# Patient Record
Sex: MALE | Race: WHITE | Employment: UNEMPLOYED | URBAN - METROPOLITAN AREA
[De-identification: names, ages, dates, MRNs, and addresses within clinical notes are randomized per-mention and may not be internally consistent; named-entity substitution may affect disease eponyms.]

---

## 2017-06-22 PROBLEM — R06.09 DYSPNEA ON EXERTION: Status: ACTIVE | Noted: 2017-06-22

## 2017-07-28 PROBLEM — R40.0 HAS DAYTIME DROWSINESS: Status: ACTIVE | Noted: 2017-07-28

## 2017-07-28 PROBLEM — R06.83 SNORING: Status: ACTIVE | Noted: 2017-07-28

## 2017-07-28 PROBLEM — R74.8 ELEVATED LIVER ENZYMES: Status: ACTIVE | Noted: 2017-07-28

## 2017-07-28 PROBLEM — E66.01 OBESITY, MORBID, BMI 50 OR HIGHER (HCC): Status: ACTIVE | Noted: 2017-07-28

## 2017-07-31 ENCOUNTER — HOSPITAL ENCOUNTER (OUTPATIENT)
Dept: ULTRASOUND IMAGING | Age: 27
Discharge: HOME | End: 2017-07-31
Payer: COMMERCIAL

## 2017-07-31 DIAGNOSIS — R74.8 ELEVATED LIVER ENZYMES: ICD-10-CM

## 2017-07-31 PROCEDURE — 76705 ECHO EXAM OF ABDOMEN: CPT

## 2017-08-10 ENCOUNTER — HOSPITAL ENCOUNTER (OUTPATIENT)
Dept: PHYSICAL THERAPY | Age: 27
Discharge: HOME | End: 2017-08-10
Payer: COMMERCIAL

## 2017-08-10 DIAGNOSIS — E66.01 OBESITY, MORBID, BMI 50 OR HIGHER (HCC): ICD-10-CM

## 2017-08-10 DIAGNOSIS — Z78.9 POOR TOLERANCE FOR AMBULATION: ICD-10-CM

## 2017-08-10 PROCEDURE — 97162 PT EVAL MOD COMPLEX 30 MIN: CPT

## 2017-08-10 PROCEDURE — 97110 THERAPEUTIC EXERCISES: CPT

## 2017-08-10 NOTE — PROGRESS NOTES
Melissa Welch  : 1990 [facility] [address], [facility]  [phone]   [phone]        OUTPATIENT PHYSICAL THERAPY:Initial Assessment 8/10/2017    ICD-10: Treatment Diagnosis: Difficulty walking, not elsewhere classified (R26.2)  Treatment diagnosis 2: Morbid obesity (E66.01)  Treatment diagnosis 3: pain in ankle, unspecified foot (M25.579)  Precautions/Allergies:   Sting, bee and Bee pollen   Fall Risk Score: 2 (? 5 = High Risk)  MD Orders: evaluate and treat  MEDICAL/REFERRING DIAGNOSIS:  Obesity, morbid, BMI 50 or higher ([facility] [address] [E66.01]  Poor tolerance for ambulation [Z78.9]   DATE OF ONSET: Gradual and progressive weight gain  REFERRING PHYSICIAN: Janes Dominique PA-C  RETURN PHYSICIAN APPOINTMENT: 17     INITIAL ASSESSMENT:  Mr. Lakesha Tatum is a 23 y.o. male presenting to physical therapy with complaints of difficulty walking and standing. Patient presents with severe morbid obesity and will be discussing gastric by-pass with surgeon in late 2018. Patient reports congestive heart failure related to obesity and also type II diabetes. Patient reports pain in ankles and instability with prolonged standing and walking. Patient reports no significant pain in other body areas. Patient has lost 100+ pounds already and currently weighs 510 lbs. Patient is a good candidate for skilled physical therapy to include therapeutic exercise, manual therapy, gait training, home exercise, and pain modalities as needed. PROBLEM LIST (Impacting functional limitations):  1. Decreased Strength  2. Decreased ADL/Functional Activities  3. Decreased Transfer Abilities  4. Decreased Ambulation Ability/Technique  5. Decreased Balance  6. Increased Pain  7. Decreased Activity Tolerance  8. Increased Fatigue  9. Increased Shortness of Breath  10.  Decreased Flexibility/Joint Mobility INTERVENTIONS PLANNED:  1. Balance Exercise  2. Cold  3. Cryotherapy  4. Electrical Stimulation  5. Gait Training  6. Heat  7. Home Exercise Program (HEP)  8. Manual Therapy  9. Neuromuscular Re-education/Strengthening  10. Range of Motion (ROM)  11. Therapeutic Activites  12. Therapeutic Exercise/Strengthening  13. Transcutaneous Electrical Nerve Stimulation (TENS)  14. Transfer Training  15. Ultrasound (US)   TREATMENT PLAN:  Effective Dates: 8/10/17 TO 9/7/17. Frequency/Duration: 1 time a week for 4 weeks  GOALS: (Goals have been discussed and agreed upon with patient.)  Short-Term Functional Goals: Time Frame: 8/10/17 to 8/24/17  1. Patient will be independent with home exercise program.  Discharge Goals: Time Frame: 8/10/17 to 9/7/17  1. Patient will be independent with home exercise program and report performing daily. 2. Patient will report no more than 2/10 pain in bilateral ankles with weight bearing and ambulation. 3. Patient will increase ambulation time to greater than 15 minutes without rest and with minimal pain. 4. Patient will report continued weight loss. 5. Patient will improve Lower extremity functional scale score to 52/80 from 41/80. Rehabilitation Potential For Stated Goals: Good  Regarding Katherine Hernandez therapy, I certify that the treatment plan above will be carried out by a therapist or under their direction. Thank you for this referral,  Melissa Norris PT     Referring Physician Signature: Loretta Carreon PA-C              Date                    The information in this section was collected on 8/10/17 (except where otherwise noted). HISTORY:   History of Present Injury/Illness (Reason for Referral):  Mr. Chance Longo is a 23 y.o. male presenting to physical therapy with complaints of difficulty walking and standing. Patient presents with severe morbid obesity and will be discussing gastric by-pass with surgeon in late August 2018.   Patient reports congestive heart failure related to obesity and also type II diabetes. Patient reports pain in ankles and instability with prolonged standing and walking. Patient reports no significant pain in other body areas. Patient has lost 100+ pounds already and currently weighs 510 lbs. Past Medical History/Comorbidities:   Mr. Ny Andino  has a past medical history of Anasarca; Congestive heart failure ([address] Hypertension; Lazy eye of right side; and Morbid obesity ([address] Mr. Ny Andino  has a past surgical history that includes tonsillectomy. Social History/Living Environment:     Patient lives at home with family. 2 level home but lives downstairs. Prior Level of Function/Work/Activity:  Patient does not work. Spends most of time at home sitting. Current Medications:       Current Outpatient Prescriptions:     rosuvastatin (CRESTOR) 20 mg tablet, Take 1 Tab by mouth nightly for 30 days. , Disp: 30 Tab, Rfl: 3    insulin degludec (TRESIBA FLEXTOUCH U-200) 200 unit/mL (3 mL) inpn, 80 Units by SubCUTAneous route daily for 30 days. , Disp: 5 Pen, Rfl: 0    lisinopril (PRINIVIL, ZESTRIL) 40 mg tablet, Take 1 Tab by mouth daily. , Disp: 90 Tab, Rfl: 3    spironolactone (ALDACTONE) 25 mg tablet, Take 1 Tab by mouth daily. , Disp: 90 Tab, Rfl: 3    carvedilol (COREG) 25 mg tablet, Take 1 Tab by mouth two (2) times daily (with meals). Indications: Chronic Heart Failure, Disp: 180 Tab, Rfl: 3    Lancets (LANCETS,THIN) misc, Test 2 times daily as directed for e11.65, Disp: 1 Each, Rfl: 11    metFORMIN (GLUCOPHAGE) 1,000 mg tablet, Take 1 Tab by mouth two (2) times daily (with meals) for 30 days. , Disp: 60 Tab, Rfl: 3    pen needle, diabetic (NOVOFINE PLUS) 32 gauge x 1/6\" ndle, 1 Each by Does Not Apply route daily. Use daily with insulin, Disp: 100 Pen Needle, Rfl: 1    rosuvastatin (CRESTOR) 10 mg tablet, Take 2 Tabs by mouth nightly., Disp: 30 Tab, Rfl: 5    insulin aspart (NOVOLOG FLEXPEN) 100 unit/mL inpn, 12 Units by SubCUTAneous route Before breakfast, lunch, and dinner. , Disp: 5 Pen, Rfl: 5    fenofibrate nanocrystallized (TRICOR) 145 mg tablet, Take 1 Tab by mouth daily. , Disp: 30 Tab, Rfl: 5    OMEGA-3 FATTY ACIDS (FISH OIL CONCENTRATE PO), Take  by mouth., Disp: , Rfl:     multivitamin (ONE A DAY) tablet, Take 1 Tab by mouth daily. , Disp: , Rfl:     CALCIUM CARBONATE (CALCIUM 500 PO), Take  by mouth., Disp: , Rfl:     glucose blood VI test strips (BLOOD GLUCOSE TEST) strip, Test sugar  2 x daily for e11.65, Disp: 200 Strip, Rfl: 6    Blood-Glucose Meter monitoring kit, Use kit for monitoring diabetes for e11.65, Disp: 1 Kit, Rfl: 0   Date Last Reviewed:  8/10/2017   Number of Personal Factors/Comorbidities that affect the Plan of Care:  (morbid obesity, congestive heart failure, diabetes) 3+: HIGH COMPLEXITY   EXAMINATION:   Observation/Orthostatic Postural Assessment:          Patient ambulates with wide stance, extreme external rotation of bilateral LE's/feet, and walks on lateral border of feet. Strength:            Gross strength in bilateral UE's is 3+/5. Gross strength in bilateral LE's is 4/5 but patient has limited mobility due to excessive adipose tissue. Neurological Screen:        Myotomes:  Bilateral lower extremities are normal        Dermatomes:  Intact for light touch and sensation    Functional Mobility:         Transfers: Moderate difficulty due to weight       Body Structures Involved:  1. Heart  2. Metabolic  3. Bones  4. Joints  5. Muscles Body Functions Affected:  1. Sensory/Pain  2. Cardio  3. Neuromusculoskeletal  4. Movement Related  5. Metobolic/Endocrine Activities and Participation Affected:  1. General Tasks and Demands  2. Mobility  3. Self Care  4. Domestic Life  5.  Community, Social and [place]   Number of elements (examined above) that affect the Plan of Care: 4+: HIGH COMPLEXITY   CLINICAL PRESENTATION:   Presentation: Evolving clinical presentation with changing clinical characteristics: Janelle [address] MAKING:   Outcome Measure: Tool Used: Lower Extremity Functional Scale (LEFS)  Score:  Initial: 41/80 Most Recent: X/80 (Date: -- )   Interpretation of Score: 20 questions each scored on a 5 point scale with 0 representing \"extreme difficulty or unable to perform\" and 4 representing \"no difficulty\". The lower the score, the greater the functional disability. 80/80 represents no disability. Minimal detectable change is 9 points. Score 80 79-63 62-48 47-32 31-16 15-1 0   Modifier CH CI CJ CK CL CM CN       Medical Necessity:   · Patient is expected to demonstrate progress in strength, range of motion, balance, coordination and functional technique to increase tolerance for weight bearing and ambulation, and facilitate weight loss. · Skilled intervention continues to be required due to difficulty with ambulation and excess weight gain. Reason for Services/Other Comments:  · Patient continues to require skilled intervention due to difficulty with ambulation and excessive weight gain. Use of outcome tool(s) and clinical judgement create a POC that gives a:  (Severe obesity, minor pain, multiple co-morbidities, moderate limitations on outcome measure) Questionable prediction of patient's progress: MODERATE COMPLEXITY            TREATMENT:   (In addition to Assessment/Re-Assessment sessions the following treatments were rendered)  Pre-treatment Symptoms/Complaints:  Patient only reports pain with prolonged standing and walking while wearing shoes. Patient reports no pain when bare foot. Pain is located in bilateral ankles. Pain: Initial: Pain Intensity 1: 0  Post Session:  Patient reports 0/10 pain     Therapeutic Exercise: (25 Minutes):  Exercises per grid below to improve mobility, strength, balance, coordination and dynamic movement of leg - bilateral to improve functional weight bearing and ambulation.   Required minimal visual, verbal and manual cues to promote proper body alignment, promote proper body posture and promote proper body mechanics. Progressed resistance, range, repetitions and complexity of movement as indicated. Date:  8/10/17 Date:   Date:     Activity/Exercise Parameters Parameters Parameters   Band curls 1x10, red     Band shoulder flexion 1x10, red     Band pull aparts 1x10, red     Band rows 1x10, green     Diagonal pull aparts 1x10,red     Arm circles 1x10     Ankles pumps 1x10     Hip circles 1x10               Manual Therapy (     ): none today    Therapeutic Modalities: for pain and edema                                                                                               HEP: As above; handouts given to patient for all exercises. Treatment/Session Assessment:    · Response to Treatment:  Patient tolerated all exercises but displayed significant fatigue with short bouts of exercise. Patient showed good understanding of home program.  · Compliance with Program/Exercises: compliant most of the time. · Recommendations/Intent for next treatment session: \"Next visit will focus on advancements to more challenging activities\". Progress overall activity and exercise as tolerated. Focus on home exercise program and increased overall activity to facilitate weight loss.     Total Treatment Duration: 45 minutes; 20 minute evaluation, 25 minutes therapeutic exercise    PT Patient Time In/Time Out  Time In: 1430  Time Out: [address] [facility], PT

## 2017-08-10 NOTE — PROGRESS NOTES
Ambulatory/Rehab Services H2 Model Falls Risk Assessment    Risk Factor Pts. ·   Confusion/Disorientation/Impulsivity  []    4 ·   Symptomatic Depression  []   2 ·   Altered Elimination  []   1 ·   Dizziness/Vertigo  []   1 ·   Gender (Male)  [x]   1 ·   Any administered antiepileptics (anticonvulsants):  []   2 ·   Any administered benzodiazepines:  []   1 ·   Visual Impairment (specify):  []   1 ·   Portable Oxygen Use  []   1 ·   Orthostatic ? BP  []   1 ·   History of Recent Falls (within 3 mos.)  []   5     Ability to Rise from Chair (choose one) Pts. ·   Ability to rise in a single movement  []   0 ·   Pushes up, successful in one attempt  [x]   1 ·   Multiple attempts, but successful  []   3 ·   Unable to rise without assistance  []   4   Total: (5 or greater = High Risk) 2     Falls Prevention Plan:   []                Physical Limitations to Exercise (specify):   []                Mobility Assistance Device (type):   []                Exercise/Equipment Adaptation (specify):    ©2010 [facility] Patent #[id number].  Federal Law prohibits the replication, distribution or use without written permission from [facility] [employer]

## 2017-08-14 PROBLEM — K76.0 FATTY INFILTRATION OF LIVER: Status: ACTIVE | Noted: 2017-08-14

## 2017-08-14 PROBLEM — R16.0 HEPATOMEGALIA: Status: ACTIVE | Noted: 2017-08-14

## 2017-08-22 ENCOUNTER — HOSPITAL ENCOUNTER (OUTPATIENT)
Dept: PHYSICAL THERAPY | Age: 27
Discharge: HOME | End: 2017-08-22
Payer: COMMERCIAL

## 2017-08-22 NOTE — PROGRESS NOTES
Patient contacted clinic on 8/21/17 to report that he had fallen at home over the weekend and was still feeling sore. Patient was advised that if pain persists then patient shoulder follow up with MD for evaluation. Patient was instructed that if he feels better then he should attend therapy on 8/22/17. Patient's mother called clinic on the morning of 8/22/17 and reported that patient would not be attending therapy today.     Ciro Phillip, PT, DPT

## 2017-08-29 ENCOUNTER — APPOINTMENT (OUTPATIENT)
Dept: PHYSICAL THERAPY | Age: 27
End: 2017-08-29
Payer: COMMERCIAL

## 2017-09-01 ENCOUNTER — HOSPITAL ENCOUNTER (OUTPATIENT)
Dept: PHYSICAL THERAPY | Age: 27
Discharge: HOME | End: 2017-09-01
Payer: COMMERCIAL

## 2017-09-01 PROCEDURE — 97110 THERAPEUTIC EXERCISES: CPT

## 2017-09-01 NOTE — PROGRESS NOTES
Cely Esparza  : 1990 [facility] at [address], [address] [place] [address]  [phone]   [phone]        OUTPATIENT PHYSICAL THERAPY:Daily Note 2017    ICD-10: Treatment Diagnosis: Difficulty walking, not elsewhere classified (R26.2)  Treatment diagnosis 2: Morbid obesity (E66.01)  Treatment diagnosis 3: pain in ankle, unspecified foot (M25.579)  Precautions/Allergies:   Sting, bee and Bee pollen   Fall Risk Score: 2 (? 5 = High Risk)  MD Orders: evaluate and treat  MEDICAL/REFERRING DIAGNOSIS:  Morbid (severe) obesity due to excess calories [E66.01]  Other specified health status [Z78.9]   DATE OF ONSET: Gradual and progressive weight gain  REFERRING PHYSICIAN: Venu Khan PA-C  RETURN PHYSICIAN APPOINTMENT: 17     INITIAL ASSESSMENT:  Mr. Daniel Calderon is a 23 y.o. male presenting to physical therapy with complaints of difficulty walking and standing. Patient presents with severe morbid obesity and will be discussing gastric by-pass with surgeon in late 2018. Patient reports congestive heart failure related to obesity and also type II diabetes. Patient reports pain in ankles and instability with prolonged standing and walking. Patient reports no significant pain in other body areas. Patient has lost 100+ pounds already and currently weighs 510 lbs. Patient is a good candidate for skilled physical therapy to include therapeutic exercise, manual therapy, gait training, home exercise, and pain modalities as needed. PROBLEM LIST (Impacting functional limitations):  1. Decreased Strength  2. Decreased ADL/Functional Activities  3. Decreased Transfer Abilities  4. Decreased Ambulation Ability/Technique  5. Decreased Balance  6. Increased Pain  7. Decreased Activity Tolerance  8. Increased Fatigue  9. Increased Shortness of Breath  10.  Decreased Flexibility/Joint Mobility INTERVENTIONS PLANNED:  1. Balance Exercise  2. Cold  3. Cryotherapy  4. Electrical Stimulation  5. Gait Training  6. Heat  7. Home Exercise Program (HEP)  8. Manual Therapy  9. Neuromuscular Re-education/Strengthening  10. Range of Motion (ROM)  11. Therapeutic Activites  12. Therapeutic Exercise/Strengthening  13. Transcutaneous Electrical Nerve Stimulation (TENS)  14. Transfer Training  15. Ultrasound (US)   TREATMENT PLAN:  Effective Dates: 8/10/17 TO 9/7/17. Frequency/Duration: 1 time a week for 4 weeks  GOALS: (Goals have been discussed and agreed upon with patient.)  Short-Term Functional Goals: Time Frame: 8/10/17 to 8/24/17  1. Patient will be independent with home exercise program.  Discharge Goals: Time Frame: 8/10/17 to 9/7/17  1. Patient will be independent with home exercise program and report performing daily. 2. Patient will report no more than 2/10 pain in bilateral ankles with weight bearing and ambulation. 3. Patient will increase ambulation time to greater than 15 minutes without rest and with minimal pain. 4. Patient will report continued weight loss. 5. Patient will improve Lower extremity functional scale score to 52/80 from 41/80. Rehabilitation Potential For Stated Goals: Good  Regarding Cresenciano Lanes therapy, I certify that the treatment plan above will be carried out by a therapist or under their direction. Thank you for this referral,  Sheri Senior PTA     Referring Physician Signature: Augustin Fabian PA-C              Date                    The information in this section was collected on 8/10/17 (except where otherwise noted). HISTORY:   History of Present Injury/Illness (Reason for Referral):  Mr. Keitha Hodgkin is a 23 y.o. male presenting to physical therapy with complaints of difficulty walking and standing. Patient presents with severe morbid obesity and will be discussing gastric by-pass with surgeon in late August 2018.   Patient reports congestive heart failure related to obesity and also type II diabetes. Patient reports pain in ankles and instability with prolonged standing and walking. Patient reports no significant pain in other body areas. Patient has lost 100+ pounds already and currently weighs 510 lbs. Past Medical History/Comorbidities:   Mr. Chance Longo  has a past medical history of Anasarca; Congestive heart failure ([address] Hypertension; Lazy eye of right side; and Morbid obesity ([address] Mr. Chance Longo  has a past surgical history that includes tonsillectomy. Social History/Living Environment:     Patient lives at home with family. 2 level home but lives downstairs. Prior Level of Function/Work/Activity:  Patient does not work. Spends most of time at home sitting. Current Medications:       Current Outpatient Prescriptions:     nystatin (MYCOSTATIN) powder, Apply  to affected area four (4) times daily. Apply powder to affected area qid, Disp: 1 Bottle, Rfl: 0    ciprofloxacin HCl (CIPRO) 500 mg tablet, Take 1 Tab by mouth two (2) times a day for 7 days. , Disp: 14 Tab, Rfl: 0    lisinopril (PRINIVIL, ZESTRIL) 40 mg tablet, Take 1 Tab by mouth daily. , Disp: 90 Tab, Rfl: 3    spironolactone (ALDACTONE) 25 mg tablet, Take 1 Tab by mouth daily. , Disp: 90 Tab, Rfl: 3    carvedilol (COREG) 25 mg tablet, Take 1 Tab by mouth two (2) times daily (with meals). Indications: Chronic Heart Failure, Disp: 180 Tab, Rfl: 3    Lancets (LANCETS,THIN) misc, Test 2 times daily as directed for e11.65, Disp: 1 Each, Rfl: 11    pen needle, diabetic (NOVOFINE PLUS) 32 gauge x 1/6\" ndle, 1 Each by Does Not Apply route daily. Use daily with insulin, Disp: 100 Pen Needle, Rfl: 1    insulin aspart (NOVOLOG FLEXPEN) 100 unit/mL inpn, 12 Units by SubCUTAneous route Before breakfast, lunch, and dinner., Disp: 5 Pen, Rfl: 5    fenofibrate nanocrystallized (TRICOR) 145 mg tablet, Take 1 Tab by mouth daily. , Disp: 30 Tab, Rfl: 5    OMEGA-3 FATTY ACIDS (FISH OIL CONCENTRATE PO), Take  by mouth., Disp: , Rfl:    multivitamin (ONE A DAY) tablet, Take 1 Tab by mouth daily. , Disp: , Rfl:     CALCIUM CARBONATE (CALCIUM 500 PO), Take  by mouth., Disp: , Rfl:     glucose blood VI test strips (BLOOD GLUCOSE TEST) strip, Test sugar  2 x daily for e11.65, Disp: 200 Strip, Rfl: 6    Blood-Glucose Meter monitoring kit, Use kit for monitoring diabetes for e11.65, Disp: 1 Kit, Rfl: 0   Date Last Reviewed:  9/1/2017   Number of Personal Factors/Comorbidities that affect the Plan of Care:  (morbid obesity, congestive heart failure, diabetes) 3+: HIGH COMPLEXITY   EXAMINATION:   Observation/Orthostatic Postural Assessment:          Patient ambulates with wide stance, extreme external rotation of bilateral LE's/feet, and walks on lateral border of feet. Strength:            Gross strength in bilateral UE's is 3+/5. Gross strength in bilateral LE's is 4/5 but patient has limited mobility due to excessive adipose tissue. Neurological Screen:        Myotomes:  Bilateral lower extremities are normal        Dermatomes:  Intact for light touch and sensation    Functional Mobility:         Transfers: Moderate difficulty due to weight       Body Structures Involved:  1. Heart  2. Metabolic  3. Bones  4. Joints  5. Muscles Body Functions Affected:  1. Sensory/Pain  2. Cardio  3. Neuromusculoskeletal  4. Movement Related  5. Metobolic/Endocrine Activities and Participation Affected:  1. General Tasks and Demands  2. Mobility  3. Self Care  4. Domestic Life  5. Community, Social and [place]   Number of elements (examined above) that affect the Plan of Care: 4+: HIGH COMPLEXITY   CLINICAL PRESENTATION:   Presentation: Evolving clinical presentation with changing clinical characteristics: MODERATE COMPLEXITY   CLINICAL DECISION MAKING:   Outcome Measure:    Tool Used: Lower Extremity Functional Scale (LEFS)  Score:  Initial: 41/80 Most Recent: X/80 (Date: -- )   Interpretation of Score: 20 questions each scored on a 5 point scale with 0 representing \"extreme difficulty or unable to perform\" and 4 representing \"no difficulty\". The lower the score, the greater the functional disability. 80/80 represents no disability. Minimal detectable change is 9 points. Score 80 79-63 62-48 47-32 31-16 15-1 0   Modifier CH CI CJ CK CL CM CN       Medical Necessity:   · Patient is expected to demonstrate progress in strength, range of motion, balance, coordination and functional technique to increase tolerance for weight bearing and ambulation, and facilitate weight loss. · Skilled intervention continues to be required due to difficulty with ambulation and excess weight gain. Reason for Services/Other Comments:  · Patient continues to require skilled intervention due to difficulty with ambulation and excessive weight gain. Use of outcome tool(s) and clinical judgement create a POC that gives a:  (Severe obesity, minor pain, multiple co-morbidities, moderate limitations on outcome measure) Questionable prediction of patient's progress: MODERATE COMPLEXITY            TREATMENT:   (In addition to Assessment/Re-Assessment sessions the following treatments were rendered)  Pre-treatment Symptoms/Complaints:  Patient reports ankle pain with wearing shoes and prolonged standing. .  Pain: Initial: Pain Intensity 1: 0  Post Session:  Patient reports 0/10 pain     Therapeutic Exercise: (40 Minutes):  Exercises per grid below to improve mobility, strength, balance, coordination and dynamic movement of leg - bilateral to improve functional weight bearing and ambulation. Required minimal visual, verbal and manual cues to promote proper body alignment, promote proper body posture and promote proper body mechanics. Progressed resistance, range, repetitions and complexity of movement as indicated.      Date:  8/10/17 Date:  9-1-17 Date:     Activity/Exercise Parameters Parameters Parameters   Band curls 1x10, red Red 2 x 10    Band shoulder flexion 1x10, red Red 2 x 10 Band pull aparts 1x10, red Red 3 x 10    Band rows 1x10, green Green 3 x 10    Diagonal pull aparts 1x10,red Red 3 x 10    Arm circles 1x10     Ankles pumps 1x10 2 x 20    Hip circles 1x10     Ankle circles  2 x 20    Long arc quads  2 x 10    Knee curls             educated in and practiced proper posture when performing exercises    Manual Therapy (     ): none today    Therapeutic Modalities: for pain and edema                                                                                               HEP: As above; handouts given to patient for all exercises. Treatment/Session Assessment:    · Response to Treatment:  Patient required frequent rest breaks due to fatigue. · Compliance with Program/Exercises: compliant most of the time. · Recommendations/Intent for next treatment session: \"Next visit will focus on advancements to more challenging activities\". Progress overall activity and exercise as tolerated. Focus on home exercise program and increased overall activity to facilitate weight loss.     Total Treatment Duration: 40 minutes    PT Patient Time In/Time Out  Time In: 1325  Time Out: 1010 Jeff Corey, PTA

## 2017-09-05 ENCOUNTER — APPOINTMENT (OUTPATIENT)
Dept: PHYSICAL THERAPY | Age: 27
End: 2017-09-05
Payer: COMMERCIAL

## 2017-10-11 NOTE — PROGRESS NOTES
vEelyn Gutiérrez  : [facility] [address], [place], [address] [place] [address]  [phone]   [phone]        OUTPATIENT PHYSICAL THERAPY:Discontinuation Summary 10/11/2017    ICD-10: Treatment Diagnosis: Difficulty walking, not elsewhere classified (R26.2)  Treatment diagnosis 2: Morbid obesity (E66.01)  Treatment diagnosis 3: pain in ankle, unspecified foot (M25.579)  Precautions/Allergies:   Sting, bee and Bee pollen   Fall Risk Score: 2 (? 5 = High Risk)  MD Orders: evaluate and treat  MEDICAL/REFERRING DIAGNOSIS:  Morbid (severe) obesity due to excess calories [E66.01]  Other specified health status [Z78.9]   DATE OF ONSET: Gradual and progressive weight gain  REFERRING PHYSICIAN: Michael Nichole PA-C  RETURN PHYSICIAN APPOINTMENT: 17     INITIAL ASSESSMENT:  Mr. Tatum Crum is a 23 y.o. male presenting to physical therapy with complaints of difficulty walking and standing. Patient presents with severe morbid obesity and will be discussing gastric by-pass with surgeon in late 2018. Patient reports congestive heart failure related to obesity and also type II diabetes. Patient reports pain in ankles and instability with prolonged standing and walking. Patient reports no significant pain in other body areas. Patient has lost 100+ pounds already and currently weighs 510 lbs. Patient was seen for 2 sessions from 8/10/17 to 17. Patient cancelled appointment on 17 due to having a fall at home. Patient was advised to seek medical treatment if having continued pain after fall. Patient then attended session on 17 and tolerated all treatments/exercises, although displayed significant fatigue with activity. Patient did not return for continued therapy after 17. Patient to be discharged at this time. PROBLEM LIST (Impacting functional limitations):  1. Decreased Strength  2.  Decreased ADL/Functional Activities  3. Decreased Transfer Abilities  4. Decreased Ambulation Ability/Technique  5. Decreased Balance  6. Increased Pain  7. Decreased Activity Tolerance  8. Increased Fatigue  9. Increased Shortness of Breath  10. Decreased Flexibility/Joint Mobility INTERVENTIONS PLANNED:  1. Balance Exercise  2. Cold  3. Cryotherapy  4. Electrical Stimulation  5. Gait Training  6. Heat  7. Home Exercise Program (HEP)  8. Manual Therapy  9. Neuromuscular Re-education/Strengthening  10. Range of Motion (ROM)  11. Therapeutic Activites  12. Therapeutic Exercise/Strengthening  13. Transcutaneous Electrical Nerve Stimulation (TENS)  14. Transfer Training  15. Ultrasound (US)   TREATMENT PLAN:  Effective Dates: 8/10/17 TO 9/7/17. Frequency/Duration: Patient was seen for 2 sessions from 8/10/17 to 9/1/17. Patient cancelled appointment on 8/22/17 due to having a fall at home. Patient was advised to seek medical treatment if having continued pain after fall. Patient then attended session on 9/1/17 and tolerated all treatments/exercises, although displayed significant fatigue with activity. Patient did not return for continued therapy after 9/1/17. Patient to be discharged at this time. GOALS: (Goals have been discussed and agreed upon with patient.)  Short-Term Functional Goals: Time Frame: 8/10/17 to 8/24/17  1. Patient will be independent with home exercise program.-not met  Discharge Goals: Time Frame: 8/10/17 to 9/7/17  1. Patient will be independent with home exercise program and report performing daily. -not met  2. Patient will report no more than 2/10 pain in bilateral ankles with weight bearing and ambulation.-not met  3. Patient will increase ambulation time to greater than 15 minutes without rest and with minimal pain. -not met  4. Patient will report continued weight loss. -not met  5. Patient will improve Lower extremity functional scale score to 52/80 from 41/80. -not met  Rehabilitation Potential For Stated Goals: Good  Regarding Joie Nguyen's therapy, I certify that the treatment plan above will be carried out by a therapist or under their direction. Thank you for this referral,  Louie Fisher PT     Referring Physician Signature: Sarita Elizondo PA-C              Date                    The information in this section was collected on 8/10/17 (except where otherwise noted). HISTORY:   History of Present Injury/Illness (Reason for Referral):  Mr. Dominique Opitz is a 23 y.o. male presenting to physical therapy with complaints of difficulty walking and standing. Patient presents with severe morbid obesity and will be discussing gastric by-pass with surgeon in late August 2018. Patient reports congestive heart failure related to obesity and also type II diabetes. Patient reports pain in ankles and instability with prolonged standing and walking. Patient reports no significant pain in other body areas. Patient has lost 100+ pounds already and currently weighs 510 lbs. Past Medical History/Comorbidities:   Mr. Dominique Opitz  has a past medical history of Anasarca; Congestive heart failure ([address] Hypertension; Lazy eye of right side; Mixed hyperlipidemia; Morbid obesity ([address] NAFLD (nonalcoholic fatty liver disease); URBANO (obstructive sleep apnea); and Type 2 diabetes mellitus ([address] Mr. Dominique Opitz  has a past surgical history that includes tonsillectomy. Social History/Living Environment:     Patient lives at home with family. 2 level home but lives downstairs. Prior Level of Function/Work/Activity:  Patient does not work. Spends most of time at home sitting.       Current Medications:       Current Outpatient Prescriptions:     omeprazole (PRILOSEC) 20 mg capsule, take 1 capsule by mouth once daily, Disp: , Rfl: 0    TRESIBA FLEXTOUCH U-200 200 unit/mL (3 mL) inpn, 170 Units by SubCUTAneous route nightly., Disp: 9 Pen, Rfl: 11    NOVOLOG FLEXPEN 100 unit/mL inpn, Correction scale 4/50>150 (up to 30 units daily), Disp: 5 Pen, Rfl: 11    metFORMIN (GLUCOPHAGE) 1,000 mg tablet, Take 1 Tab by mouth two (2) times daily (with meals). , Disp: 60 Tab, Rfl: 11    VICTOZA 2-ROXANN 0.6 mg/0.1 mL (18 mg/3 mL) pnij, 1.2 mg by SubCUTAneous route daily. , Disp: 2 Pen, Rfl: 11    ONETOUCH ULTRA TEST strip, 3 times a day (E11.65), Disp: 100 Strip, Rfl: 11    ONETOUCH DELICA LANCETS 30 gauge misc, 3 times a day (E11.65), Disp: 100 Lancet, Rfl: 11    rosuvastatin (CRESTOR) 20 mg tablet, Take 1 Tab by mouth nightly., Disp: 30 Tab, Rfl: 11    fenofibrate nanocrystallized (TRICOR) 145 mg tablet, Take 1 Tab by mouth daily. , Disp: 30 Tab, Rfl: 11    carvedilol (COREG) 25 mg tablet, Take 1 Tab by mouth two (2) times daily (with meals). Indications: Chronic Heart Failure, Disp: 180 Tab, Rfl: 3    spironolactone (ALDACTONE) 25 mg tablet, Take 1 Tab by mouth daily. , Disp: 90 Tab, Rfl: 3    lisinopril (PRINIVIL, ZESTRIL) 40 mg tablet, Take 1 Tab by mouth daily. , Disp: 90 Tab, Rfl: 3    nystatin (MYCOSTATIN) powder, Apply  to affected area four (4) times daily. Apply powder to affected area qid, Disp: 1 Bottle, Rfl: 0    OMEGA-3 FATTY ACIDS (FISH OIL CONCENTRATE PO), Take  by mouth., Disp: , Rfl:     multivitamin (ONE A DAY) tablet, Take 1 Tab by mouth daily. , Disp: , Rfl:     CALCIUM CARBONATE (CALCIUM 500 PO), Take  by mouth., Disp: , Rfl:    Date Last Reviewed:  10/11/2017   Number of Personal Factors/Comorbidities that affect the Plan of Care:  (morbid obesity, congestive heart failure, diabetes) 3+: HIGH COMPLEXITY   EXAMINATION:   Observation/Orthostatic Postural Assessment:          Patient ambulates with wide stance, extreme external rotation of bilateral LE's/feet, and walks on lateral border of feet. Strength:            Gross strength in bilateral UE's is 3+/5. Gross strength in bilateral LE's is 4/5 but patient has limited mobility due to excessive adipose tissue.     Neurological Screen:        Myotomes:  Bilateral lower extremities are normal        Dermatomes:  Intact for light touch and sensation    Functional Mobility:         Transfers: Moderate difficulty due to weight       Body Structures Involved:  1. Heart  2. Metabolic  3. Bones  4. Joints  5. Muscles Body Functions Affected:  1. Sensory/Pain  2. Cardio  3. Neuromusculoskeletal  4. Movement Related  5. Metobolic/Endocrine Activities and Participation Affected:  1. General Tasks and Demands  2. Mobility  3. Self Care  4. Domestic Life  5. Community, Social and [place]   Number of elements (examined above) that affect the Plan of Care: 4+: HIGH COMPLEXITY   CLINICAL PRESENTATION:   Presentation: Evolving clinical presentation with changing clinical characteristics: MODERATE COMPLEXITY   CLINICAL DECISION MAKING:   Outcome Measure: Tool Used: Lower Extremity Functional Scale (LEFS)  Score:  Initial: 41/80 Most Recent: X/80 (Date: -- )   Interpretation of Score: 20 questions each scored on a 5 point scale with 0 representing \"extreme difficulty or unable to perform\" and 4 representing \"no difficulty\". The lower the score, the greater the functional disability. 80/80 represents no disability. Minimal detectable change is 9 points. Score 80 79-63 62-48 47-32 31-16 15-1 0   Modifier CH CI CJ CK CL CM CN       Medical Necessity:   Patient was seen for 2 sessions from 8/10/17 to 9/1/17. Patient cancelled appointment on 8/22/17 due to having a fall at home. Patient was advised to seek medical treatment if having continued pain after fall. Patient then attended session on 9/1/17 and tolerated all treatments/exercises, although displayed significant fatigue with activity. Patient did not return for continued therapy after 9/1/17. Patient to be discharged at this time.      Use of outcome tool(s) and clinical judgement create a POC that gives a:  (Severe obesity, minor pain, multiple co-morbidities, moderate limitations on outcome measure) Questionable prediction of patient's progress: MODERATE COMPLEXITY            TREATMENT:   (In addition to Assessment/Re-Assessment sessions the following treatments were rendered)  Pre-treatment Symptoms/Complaints:  All information taken from last attended visit. Pain: Initial:    Post Session:  Patient reports 0/10 pain     Therapeutic Exercise: :  Exercises per grid below to improve mobility, strength, balance, coordination and dynamic movement of leg - bilateral to improve functional weight bearing and ambulation. Required minimal visual, verbal and manual cues to promote proper body alignment, promote proper body posture and promote proper body mechanics. Progressed resistance, range, repetitions and complexity of movement as indicated. Date:  8/10/17 Date:  9-1-17 Date:     Activity/Exercise Parameters Parameters Parameters   Band curls 1x10, red Red 2 x 10    Band shoulder flexion 1x10, red Red 2 x 10    Band pull aparts 1x10, red Red 3 x 10    Band rows 1x10, green Green 3 x 10    Diagonal pull aparts 1x10,red Red 3 x 10    Arm circles 1x10     Ankles pumps 1x10 2 x 20    Hip circles 1x10     Ankle circles  2 x 20    Long arc quads  2 x 10    Knee curls             educated in and practiced proper posture when performing exercises    Manual Therapy (     ): none today    Therapeutic Modalities: for pain and edema                                                                                               HEP: As above; handouts given to patient for all exercises. Treatment/Session Assessment:    · Response to Treatment:  Patient was tolerating all treatments and exercises as last visit but displays significant fatigue with activity. · Compliance with Program/Exercises: compliant most of the time. · Recommendations/Intent for next treatment session: Patient to be discharged at this time.        Jonny Henderson, PT

## 2017-11-21 ENCOUNTER — HOSPITAL ENCOUNTER (OUTPATIENT)
Dept: LAB | Age: 27
Discharge: HOME | End: 2017-11-21
Payer: COMMERCIAL

## 2017-11-21 LAB
APPEARANCE UR: CLEAR
BILIRUB UR QL: NEGATIVE
COLOR UR: YELLOW
GLUCOSE UR STRIP.AUTO-MCNC: NEGATIVE MG/DL
HGB UR QL STRIP: NEGATIVE
KETONES UR QL STRIP.AUTO: NEGATIVE MG/DL
LEUKOCYTE ESTERASE UR QL STRIP.AUTO: NEGATIVE
NITRITE UR QL STRIP.AUTO: NEGATIVE
PH UR STRIP: 5.5 [PH] (ref 5–9)
PROT UR STRIP-MCNC: NEGATIVE MG/DL
SP GR UR REFRACTOMETRY: 1.02 (ref 1–1.02)
UROBILINOGEN UR QL STRIP.AUTO: 0.2 EU/DL (ref 0.2–1)

## 2017-11-21 PROCEDURE — 87086 URINE CULTURE/COLONY COUNT: CPT | Performed by: INTERNAL MEDICINE

## 2017-11-21 PROCEDURE — 81003 URINALYSIS AUTO W/O SCOPE: CPT | Performed by: INTERNAL MEDICINE

## 2017-11-23 LAB
BACTERIA SPEC CULT: NORMAL
SERVICE CMNT-IMP: NORMAL

## 2018-03-07 PROBLEM — Z79.4 CONTROLLED TYPE 2 DIABETES MELLITUS WITHOUT COMPLICATION, WITH LONG-TERM CURRENT USE OF INSULIN (HCC): Status: ACTIVE | Noted: 2018-03-07

## 2018-03-07 PROBLEM — E11.9 CONTROLLED TYPE 2 DIABETES MELLITUS WITHOUT COMPLICATION, WITH LONG-TERM CURRENT USE OF INSULIN (HCC): Status: ACTIVE | Noted: 2018-03-07

## 2018-10-03 PROBLEM — K75.81 NASH (NONALCOHOLIC STEATOHEPATITIS): Status: ACTIVE | Noted: 2018-10-03

## 2018-10-03 PROBLEM — E87.5 HYPERKALEMIA: Status: ACTIVE | Noted: 2018-10-03

## 2018-10-03 PROBLEM — D64.9 ANEMIA: Status: ACTIVE | Noted: 2018-10-03

## 2018-10-03 PROBLEM — N18.30 CHRONIC KIDNEY DISEASE, STAGE 3 (HCC): Status: ACTIVE | Noted: 2018-10-03

## 2018-10-17 PROBLEM — R79.89 ABNORMAL TSH: Status: ACTIVE | Noted: 2018-10-17

## 2018-11-12 ENCOUNTER — HOSPITAL ENCOUNTER (OUTPATIENT)
Dept: ULTRASOUND IMAGING | Age: 28
Discharge: HOME | End: 2018-11-12
Payer: COMMERCIAL

## 2018-11-12 DIAGNOSIS — R79.89 ABNORMAL THYROID BLOOD TEST: ICD-10-CM

## 2018-11-12 PROCEDURE — 76536 US EXAM OF HEAD AND NECK: CPT

## 2018-12-06 ENCOUNTER — HOSPITAL ENCOUNTER (OUTPATIENT)
Dept: LAB | Age: 28
Discharge: HOME | End: 2018-12-06
Payer: COMMERCIAL

## 2018-12-06 LAB
ALBUMIN SERPL-MCNC: 3.7 G/DL (ref 3.5–5)
ALBUMIN/GLOB SERPL: 0.8 {RATIO}
ALP SERPL-CCNC: 78 U/L (ref 50–136)
ALT SERPL-CCNC: 54 U/L (ref 12–65)
ANION GAP SERPL CALC-SCNC: 6 MMOL/L
AST SERPL-CCNC: 39 U/L (ref 15–37)
BASOPHILS # BLD: 0.1 K/UL (ref 0–0.2)
BASOPHILS NFR BLD: 1 % (ref 0–2)
BILIRUB SERPL-MCNC: 0.6 MG/DL (ref 0.2–1.1)
BUN SERPL-MCNC: 15 MG/DL (ref 6–23)
CALCIUM SERPL-MCNC: 8.7 MG/DL (ref 8.3–10.4)
CHLORIDE SERPL-SCNC: 101 MMOL/L (ref 98–107)
CO2 SERPL-SCNC: 30 MMOL/L (ref 21–32)
CREAT SERPL-MCNC: 1.3 MG/DL (ref 0.8–1.5)
DIFFERENTIAL METHOD BLD: NORMAL
EOSINOPHIL # BLD: 0.2 K/UL (ref 0–0.8)
EOSINOPHIL NFR BLD: 3 % (ref 0.5–7.8)
ERYTHROCYTE [DISTWIDTH] IN BLOOD BY AUTOMATED COUNT: 13.7 % (ref 11.9–14.6)
GLOBULIN SER CALC-MCNC: 4.5 G/DL
GLUCOSE SERPL-MCNC: 104 MG/DL (ref 65–100)
HCT VFR BLD AUTO: 42.5 % (ref 41.1–50.3)
HGB BLD-MCNC: 13.7 G/DL (ref 13.6–17.2)
IMM GRANULOCYTES # BLD: 0.1 K/UL (ref 0–0.5)
IMM GRANULOCYTES NFR BLD AUTO: 1 % (ref 0–5)
LYMPHOCYTES # BLD: 2.3 K/UL (ref 0.5–4.6)
LYMPHOCYTES NFR BLD: 32 % (ref 13–44)
MCH RBC QN AUTO: 29 PG (ref 26.1–32.9)
MCHC RBC AUTO-ENTMCNC: 32.2 G/DL (ref 31.4–35)
MCV RBC AUTO: 90 FL (ref 79.6–97.8)
MONOCYTES # BLD: 0.5 K/UL (ref 0.1–1.3)
MONOCYTES NFR BLD: 7 % (ref 4–12)
NEUTS SEG # BLD: 4.1 K/UL (ref 1.7–8.2)
NEUTS SEG NFR BLD: 56 % (ref 43–78)
NRBC # BLD: 0 K/UL (ref 0–0.2)
PLATELET # BLD AUTO: 354 K/UL (ref 150–450)
PMV BLD AUTO: 10.2 FL (ref 9.4–12.3)
POTASSIUM SERPL-SCNC: 4.3 MMOL/L (ref 3.5–5.1)
PROT SERPL-MCNC: 8.2 G/DL (ref 6.3–8.2)
RBC # BLD AUTO: 4.72 M/UL (ref 4.23–5.6)
SODIUM SERPL-SCNC: 137 MMOL/L (ref 136–145)
WBC # BLD AUTO: 7.3 K/UL (ref 4.3–11.1)

## 2018-12-06 PROCEDURE — 80053 COMPREHEN METABOLIC PANEL: CPT

## 2018-12-06 PROCEDURE — 85025 COMPLETE CBC W/AUTO DIFF WBC: CPT

## 2018-12-06 PROCEDURE — 36415 COLL VENOUS BLD VENIPUNCTURE: CPT

## 2019-03-15 PROBLEM — E05.90 SUBCLINICAL HYPERTHYROIDISM: Status: ACTIVE | Noted: 2019-03-15

## 2019-08-05 PROBLEM — K21.9 GASTROESOPHAGEAL REFLUX DISEASE: Status: ACTIVE | Noted: 2017-08-28

## 2019-08-05 PROBLEM — G47.33 OSA (OBSTRUCTIVE SLEEP APNEA): Status: ACTIVE | Noted: 2017-09-13

## 2019-08-05 PROBLEM — D53.9 NUTRITIONAL ANEMIA, UNSPECIFIED: Status: ACTIVE | Noted: 2018-10-03

## 2019-08-05 PROBLEM — H16.223 KERATOCONJUNCTIVITIS SICCA NOT SPECIFIED AS SJOGREN'S, BILATERAL: Status: ACTIVE | Noted: 2019-04-24

## 2019-08-05 PROBLEM — Z98.84 STATUS POST BARIATRIC SURGERY: Status: ACTIVE | Noted: 2019-01-03

## 2019-08-05 PROBLEM — N18.30 CHRONIC KIDNEY DISEASE, STAGE 3 (HCC): Status: RESOLVED | Noted: 2018-10-03 | Resolved: 2019-08-05

## 2019-08-05 PROBLEM — E11.9 DIABETES TYPE 2, NO OCULAR INVOLVEMENT (HCC): Status: ACTIVE | Noted: 2017-08-28

## 2019-08-05 PROBLEM — E78.5 DYSLIPIDEMIA: Status: ACTIVE | Noted: 2017-08-28

## 2019-08-05 PROBLEM — E55.9 VITAMIN D DEFICIENCY: Status: ACTIVE | Noted: 2019-04-01

## 2019-08-05 PROBLEM — H50.15 ALTERNATING EXOTROPIA: Status: ACTIVE | Noted: 2019-04-24

## 2019-08-05 PROBLEM — K91.2 MALNUTRITION FOLLOWING GASTROINTESTINAL SURGERY: Status: ACTIVE | Noted: 2019-01-03

## 2019-08-05 PROBLEM — M54.50 CHRONIC BILATERAL LOW BACK PAIN WITHOUT SCIATICA: Status: ACTIVE | Noted: 2017-08-28

## 2019-08-05 PROBLEM — G89.29 CHRONIC BILATERAL LOW BACK PAIN WITHOUT SCIATICA: Status: ACTIVE | Noted: 2017-08-28

## 2020-09-11 LAB — HBA1C MFR BLD HPLC: 10.9 %

## 2021-08-03 PROBLEM — E66.01 MORBID OBESITY (HCC): Status: RESOLVED | Noted: 2021-08-03 | Resolved: 2021-08-03

## 2021-12-22 PROBLEM — E66.01 MORBID OBESITY (HCC): Status: ACTIVE | Noted: 2021-08-03

## 2021-12-22 PROBLEM — E11.65 UNCONTROLLED TYPE 2 DIABETES MELLITUS WITH HYPERGLYCEMIA (HCC): Status: ACTIVE | Noted: 2021-12-22

## 2022-03-18 PROBLEM — R74.8 ELEVATED LIVER ENZYMES: Status: ACTIVE | Noted: 2017-07-28

## 2022-03-18 PROBLEM — G89.29 CHRONIC BILATERAL LOW BACK PAIN WITHOUT SCIATICA: Status: ACTIVE | Noted: 2017-08-28

## 2022-03-18 PROBLEM — M54.50 CHRONIC BILATERAL LOW BACK PAIN WITHOUT SCIATICA: Status: ACTIVE | Noted: 2017-08-28

## 2022-03-18 PROBLEM — R40.0 HAS DAYTIME DROWSINESS: Status: ACTIVE | Noted: 2017-07-28

## 2022-03-18 PROBLEM — K91.2 MALNUTRITION FOLLOWING GASTROINTESTINAL SURGERY: Status: ACTIVE | Noted: 2019-01-03

## 2022-03-18 PROBLEM — H16.223 KERATOCONJUNCTIVITIS SICCA NOT SPECIFIED AS SJOGREN'S, BILATERAL: Status: ACTIVE | Noted: 2019-04-24

## 2022-03-18 PROBLEM — R06.83 SNORING: Status: ACTIVE | Noted: 2017-07-28

## 2022-03-19 PROBLEM — R16.0 HEPATOMEGALIA: Status: ACTIVE | Noted: 2017-08-14

## 2022-03-19 PROBLEM — E55.9 VITAMIN D DEFICIENCY: Status: ACTIVE | Noted: 2019-04-01

## 2022-03-19 PROBLEM — R06.09 DYSPNEA ON EXERTION: Status: ACTIVE | Noted: 2017-06-22

## 2022-03-19 PROBLEM — E66.01 OBESITY, MORBID, BMI 50 OR HIGHER (HCC): Status: ACTIVE | Noted: 2017-07-28

## 2022-03-19 PROBLEM — E78.5 DYSLIPIDEMIA: Status: ACTIVE | Noted: 2017-08-28

## 2022-03-19 PROBLEM — G47.33 OSA (OBSTRUCTIVE SLEEP APNEA): Status: ACTIVE | Noted: 2017-09-13

## 2022-03-19 PROBLEM — E66.01 MORBID OBESITY (HCC): Status: ACTIVE | Noted: 2021-08-03

## 2022-03-19 PROBLEM — E87.5 HYPERKALEMIA: Status: ACTIVE | Noted: 2018-10-03

## 2022-03-19 PROBLEM — K21.9 GASTROESOPHAGEAL REFLUX DISEASE: Status: ACTIVE | Noted: 2017-08-28

## 2022-03-19 PROBLEM — K75.81 NASH (NONALCOHOLIC STEATOHEPATITIS): Status: ACTIVE | Noted: 2018-10-03

## 2022-03-19 PROBLEM — R79.89 ABNORMAL TSH: Status: ACTIVE | Noted: 2018-10-17

## 2022-03-19 PROBLEM — H50.15 ALTERNATING EXOTROPIA: Status: ACTIVE | Noted: 2019-04-24

## 2022-03-19 PROBLEM — E11.9 DIABETES TYPE 2, NO OCULAR INVOLVEMENT (HCC): Status: ACTIVE | Noted: 2017-08-28

## 2022-03-19 PROBLEM — E05.90 SUBCLINICAL HYPERTHYROIDISM: Status: ACTIVE | Noted: 2019-03-15

## 2022-03-19 PROBLEM — Z98.84 GASTRIC BYPASS STATUS FOR OBESITY: Status: ACTIVE | Noted: 2019-01-03

## 2022-03-20 PROBLEM — K76.0 FATTY INFILTRATION OF LIVER: Status: ACTIVE | Noted: 2017-08-14

## 2022-03-20 PROBLEM — D53.9 NUTRITIONAL ANEMIA, UNSPECIFIED: Status: ACTIVE | Noted: 2018-10-03

## 2022-03-20 PROBLEM — E11.65 UNCONTROLLED TYPE 2 DIABETES MELLITUS WITH HYPERGLYCEMIA (HCC): Status: ACTIVE | Noted: 2021-12-22

## 2022-04-28 PROBLEM — H50.15 ALTERNATING EXOTROPIA: Status: RESOLVED | Noted: 2019-04-24 | Resolved: 2022-04-28

## 2022-04-28 PROBLEM — E05.90 SUBCLINICAL HYPERTHYROIDISM: Chronic | Status: ACTIVE | Noted: 2019-03-15

## 2022-04-28 PROBLEM — G47.33 OSA (OBSTRUCTIVE SLEEP APNEA): Chronic | Status: ACTIVE | Noted: 2017-09-13

## 2022-04-28 PROBLEM — E11.65 UNCONTROLLED TYPE 2 DIABETES MELLITUS WITH HYPERGLYCEMIA (HCC): Chronic | Status: ACTIVE | Noted: 2021-12-22

## 2022-05-17 DIAGNOSIS — E78.2 MIXED HYPERLIPIDEMIA: ICD-10-CM

## 2022-05-17 DIAGNOSIS — E55.9 VITAMIN D DEFICIENCY: ICD-10-CM

## 2022-05-17 DIAGNOSIS — E05.90 SUBCLINICAL HYPERTHYROIDISM: ICD-10-CM

## 2022-05-17 DIAGNOSIS — E11.65 UNCONTROLLED TYPE 2 DIABETES MELLITUS WITH HYPERGLYCEMIA (HCC): ICD-10-CM

## 2022-05-17 DIAGNOSIS — I10 ESSENTIAL HYPERTENSION: Primary | ICD-10-CM

## 2022-06-01 ENCOUNTER — OFFICE VISIT (OUTPATIENT)
Dept: ENDOCRINOLOGY | Age: 32
End: 2022-06-01
Payer: COMMERCIAL

## 2022-06-01 VITALS
BODY MASS INDEX: 50.62 KG/M2 | SYSTOLIC BLOOD PRESSURE: 120 MMHG | DIASTOLIC BLOOD PRESSURE: 62 MMHG | WEIGHT: 315 LBS | OXYGEN SATURATION: 99 % | HEIGHT: 66 IN | HEART RATE: 89 BPM

## 2022-06-01 DIAGNOSIS — E78.2 MIXED HYPERLIPIDEMIA: ICD-10-CM

## 2022-06-01 DIAGNOSIS — E11.65 UNCONTROLLED TYPE 2 DIABETES MELLITUS WITH HYPERGLYCEMIA (HCC): ICD-10-CM

## 2022-06-01 DIAGNOSIS — E66.01 MORBID OBESITY WITH BMI OF 60.0-69.9, ADULT (HCC): ICD-10-CM

## 2022-06-01 DIAGNOSIS — E55.9 VITAMIN D DEFICIENCY: ICD-10-CM

## 2022-06-01 DIAGNOSIS — E11.65 UNCONTROLLED TYPE 2 DIABETES MELLITUS WITH HYPERGLYCEMIA (HCC): Primary | ICD-10-CM

## 2022-06-01 DIAGNOSIS — E05.90 SUBCLINICAL HYPERTHYROIDISM: ICD-10-CM

## 2022-06-01 DIAGNOSIS — I10 ESSENTIAL HYPERTENSION: ICD-10-CM

## 2022-06-01 LAB
25(OH)D3 SERPL-MCNC: 15 NG/ML (ref 30–100)
ALBUMIN SERPL-MCNC: 3.9 G/DL (ref 3.5–5)
ALBUMIN/GLOB SERPL: 1 {RATIO} (ref 1.2–3.5)
ALP SERPL-CCNC: 101 U/L (ref 50–136)
ALT SERPL-CCNC: 38 U/L (ref 12–65)
ANION GAP SERPL CALC-SCNC: 6 MMOL/L (ref 7–16)
AST SERPL-CCNC: 17 U/L (ref 15–37)
BASOPHILS # BLD: 0.1 K/UL (ref 0–0.2)
BASOPHILS NFR BLD: 1 % (ref 0–2)
BILIRUB DIRECT SERPL-MCNC: 0.2 MG/DL
BILIRUB SERPL-MCNC: 0.6 MG/DL (ref 0.2–1.1)
BUN SERPL-MCNC: 15 MG/DL (ref 6–23)
CALCIUM SERPL-MCNC: 9.3 MG/DL (ref 8.3–10.4)
CHLORIDE SERPL-SCNC: 99 MMOL/L (ref 98–107)
CO2 SERPL-SCNC: 30 MMOL/L (ref 21–32)
CREAT SERPL-MCNC: 1 MG/DL (ref 0.8–1.5)
DIFFERENTIAL METHOD BLD: ABNORMAL
EOSINOPHIL # BLD: 0.1 K/UL (ref 0–0.8)
EOSINOPHIL NFR BLD: 2 % (ref 0.5–7.8)
ERYTHROCYTE [DISTWIDTH] IN BLOOD BY AUTOMATED COUNT: 14.8 % (ref 11.9–14.6)
GLOBULIN SER CALC-MCNC: 3.9 G/DL (ref 2.3–3.5)
GLUCOSE SERPL-MCNC: 254 MG/DL (ref 65–100)
HCT VFR BLD AUTO: 48.1 % (ref 41.1–50.3)
HGB BLD-MCNC: 16.6 G/DL (ref 13.6–17.2)
IMM GRANULOCYTES # BLD AUTO: 0.1 K/UL (ref 0–0.5)
IMM GRANULOCYTES NFR BLD AUTO: 1 % (ref 0–5)
LYMPHOCYTES # BLD: 2.1 K/UL (ref 0.5–4.6)
LYMPHOCYTES NFR BLD: 34 % (ref 13–44)
MCH RBC QN AUTO: 28.2 PG (ref 26.1–32.9)
MCHC RBC AUTO-ENTMCNC: 34.5 G/DL (ref 31.4–35)
MCV RBC AUTO: 81.8 FL (ref 79.6–97.8)
MONOCYTES # BLD: 0.5 K/UL (ref 0.1–1.3)
MONOCYTES NFR BLD: 8 % (ref 4–12)
NEUTS SEG # BLD: 3.4 K/UL (ref 1.7–8.2)
NEUTS SEG NFR BLD: 54 % (ref 43–78)
NRBC # BLD: 0 K/UL (ref 0–0.2)
PLATELET # BLD AUTO: 310 K/UL (ref 150–450)
PMV BLD AUTO: 10.7 FL (ref 9.4–12.3)
POTASSIUM SERPL-SCNC: 3.9 MMOL/L (ref 3.5–5.1)
PROT SERPL-MCNC: 7.8 G/DL (ref 6.3–8.2)
RBC # BLD AUTO: 5.88 M/UL (ref 4.23–5.6)
SODIUM SERPL-SCNC: 135 MMOL/L (ref 138–145)
T3 SERPL-MCNC: 1 NG/ML (ref 0.6–1.81)
T4 FREE SERPL-MCNC: 1.3 NG/DL (ref 0.78–1.46)
TSH, 3RD GENERATION: 0.84 UIU/ML (ref 0.36–3.74)
WBC # BLD AUTO: 6.3 K/UL (ref 4.3–11.1)

## 2022-06-01 PROCEDURE — 99214 OFFICE O/P EST MOD 30 MIN: CPT | Performed by: PHYSICIAN ASSISTANT

## 2022-06-01 PROCEDURE — 3052F HG A1C>EQUAL 8.0%<EQUAL 9.0%: CPT | Performed by: PHYSICIAN ASSISTANT

## 2022-06-01 RX ORDER — PEN NEEDLE, DIABETIC 32GX 5/32"
1 NEEDLE, DISPOSABLE MISCELLANEOUS DAILY
Qty: 100 EACH | Refills: 3 | Status: CANCELLED | OUTPATIENT
Start: 2022-06-01

## 2022-06-01 RX ORDER — INSULIN ASPART 100 [IU]/ML
INJECTION, SOLUTION INTRAVENOUS; SUBCUTANEOUS
COMMUNITY
End: 2022-06-01 | Stop reason: SDUPTHER

## 2022-06-01 RX ORDER — METHIMAZOLE 5 MG/1
2.5 TABLET ORAL DAILY
Qty: 45 TABLET | Refills: 3 | Status: SHIPPED | OUTPATIENT
Start: 2022-06-01

## 2022-06-01 RX ORDER — SEMAGLUTIDE 1.34 MG/ML
0.5 INJECTION, SOLUTION SUBCUTANEOUS WEEKLY
Qty: 9 ML | Refills: 3 | Status: SHIPPED | OUTPATIENT
Start: 2022-06-01 | End: 2022-09-01 | Stop reason: ALTCHOICE

## 2022-06-01 RX ORDER — SEMAGLUTIDE 1.34 MG/ML
0.5 INJECTION, SOLUTION SUBCUTANEOUS WEEKLY
COMMUNITY
End: 2022-06-01 | Stop reason: SDUPTHER

## 2022-06-01 RX ORDER — INSULIN ASPART 100 [IU]/ML
INJECTION, SOLUTION INTRAVENOUS; SUBCUTANEOUS
Qty: 30 ML | Refills: 3 | Status: SHIPPED | OUTPATIENT
Start: 2022-06-01

## 2022-06-01 RX ORDER — SPIRONOLACTONE 25 MG/1
TABLET ORAL
COMMUNITY
Start: 2022-04-26 | End: 2022-07-19 | Stop reason: SDUPTHER

## 2022-06-01 RX ORDER — CHOLECALCIFEROL (VITAMIN D3) 125 MCG
1 CAPSULE ORAL DAILY
Qty: 90 CAPSULE | Refills: 3 | Status: SHIPPED | OUTPATIENT
Start: 2022-06-01

## 2022-06-01 RX ORDER — INSULIN DEGLUDEC 200 U/ML
116 INJECTION, SOLUTION SUBCUTANEOUS
COMMUNITY
End: 2022-06-01 | Stop reason: SDUPTHER

## 2022-06-01 RX ORDER — EMPAGLIFLOZIN 25 MG/1
25 TABLET, FILM COATED ORAL DAILY
COMMUNITY
End: 2022-06-01 | Stop reason: SDUPTHER

## 2022-06-01 RX ORDER — INSULIN DEGLUDEC 200 U/ML
INJECTION, SOLUTION SUBCUTANEOUS
Qty: 69 ML | Refills: 3 | Status: SHIPPED | OUTPATIENT
Start: 2022-06-01

## 2022-06-01 RX ORDER — EMPAGLIFLOZIN 25 MG/1
25 TABLET, FILM COATED ORAL DAILY
Qty: 90 TABLET | Refills: 3 | Status: SHIPPED | OUTPATIENT
Start: 2022-06-01 | End: 2022-07-19 | Stop reason: SDUPTHER

## 2022-06-01 RX ORDER — BLOOD SUGAR DIAGNOSTIC
STRIP MISCELLANEOUS
Qty: 300 EACH | Refills: 3 | Status: SHIPPED | OUTPATIENT
Start: 2022-06-01 | End: 2022-07-05 | Stop reason: SDUPTHER

## 2022-06-01 RX ORDER — ROSUVASTATIN CALCIUM 40 MG/1
40 TABLET, COATED ORAL EVERY EVENING
Qty: 90 TABLET | Refills: 3 | Status: SHIPPED | OUTPATIENT
Start: 2022-06-01 | End: 2022-07-19 | Stop reason: SDUPTHER

## 2022-06-01 NOTE — PROGRESS NOTES
[facility] ENDOCRINOLOGY   AND   THYROID NODULE CLINIC    Ruthy Whitney PA-C  [facility] Endocrinology and Thyroid Nodule Clinic  [address], [address]  [place], [address]  Phone [phone]  Facsimile [phone]          Cassie Frankel is a 28 y.o. male seen 6/1/2022 for follow up evaluation of type 2 diabetes        Assessment and Plan:    In office COVID-19 PPE worn and precautions taken    1. Uncontrolled type 2 diabetes mellitus with hyperglycemia ([address]  With wildly uncontrolled type 2 diabetes previously well controlled, status post bariatric surgery multiple medications were discontinued. Unfortunately patient continues to eat large portions regularly BMI now greater than 60. Exercise discussed at length. Patient encouraged to reduce portion size. Patient encouraged to increase fiber and protein in diet. Patient encouraged to check blood glucose more regularly at the 4 before so that not only may he benefit from correction scale insulin but also so that he may be eligible for CGM therapy. Patient recently started on Ozempic with good tolerance. Has at least 1 more dose at the 0.25 mg and then will uptitrate to 0.5 mg. We will plan to increase to the 1 mg and eventually the 2 mg dose as rapidly as it is safe so that he may benefit from the multiple benefits of this GLP-1 agent including but not limited to improve glycemic control, weight loss, and cardiovascular endpoint data    At today's visit we discussed sequela associated with uncontrolled diabetes including increased risk of stroke, heart attack, kidney failure, amputation, retinopathy, neuropathy, and nephropathy. Patient was strongly encouraged to comply with treatment regimen as well as dietary and exercise recommendations to aid in control of this chronic disease to help prevent complications associated with uncontrolled diabetes.     - NOVOLOG FLEXPEN 100 UNIT/ML injection pen; Correction scale 5/50>150 (up to 30 units daily)  Dispense: 30 mL; Refill: 3  - JARDIANCE 25 MG tablet; Take 1 tablet by mouth daily  Dispense: 90 tablet; Refill: 3  - TRESIBA FLEXTOUCH 200 UNIT/ML SOPN; Start 116 units daily, increase by 4 units every 4 days until FBG , max daily 150  Dispense: 69 mL; Refill: 3  - OZEMPIC, 0.25 OR 0.5 MG/DOSE, 2 MG/1.5ML SOPN; Inject 0.5 mg into the skin once a week  Dispense: 9 mL; Refill: 3  - ACCU-CHEK GUIDE strip; Check blood glucose three times daily. Dx E11.65  Dispense: 300 each; Refill: 3  - methIMAzole (TAPAZOLE) 5 MG tablet; Take 0.5 tablets by mouth daily  Dispense: 45 tablet; Refill: 3  - Fructosamine; Future  - TSH; Future  - T3; Future  - T4, Free; Future  - CBC with Auto Differential; Future  - Hepatic Function Panel; Future  - Insulin Pen Needle 32G X 6 MM MISC; Use with insulin up to 4 times daily, E11.65  Dispense: 400 each; Refill: 3  - Basic Metabolic Panel; Future    2. Subclinical hyperthyroidism  Historically euthyroid on 2.5 mg of methimazole which she will continue  - TSH; Future  - T3; Future  - T4, Free; Future  - Hepatic Function Panel; Future    3. Essential hypertension  BP Readings from Last 3 Encounters:   06/01/22 120/62   04/28/22 110/74   04/26/22 130/82         4. Vitamin D deficiency  Recheck labs, not currently on supplement  - Vitamin D 25 Hydroxy; Future    5. Mixed hyperlipidemia  Above goal March 2022, prescribed rosuvastatin 40 mg. Other metabolic conditions may be contributory. Diet and exercise discussed  Lab Results   Component Value Date    LDLCALC 113 (H) 03/08/2022       - rosuvastatin (CRESTOR) 40 MG tablet; Take 1 tablet by mouth every evening  Dispense: 90 tablet; Refill: 3    6. Morbid obesity with BMI of 60.0-69.9, adult (HCC)  BMI greater than 60, diet and exercise discussed at length. Would likely benefit from maximization of GLP-1 therapy as above.  Patient would certainly benefit from improved glycemic control with agents that are either weight neutral or confer weight loss as a side effect. Julisa Massey was seen today for diabetes. Diagnoses and all orders for this visit:    Uncontrolled type 2 diabetes mellitus with hyperglycemia ([facility]  -     NOVOLOG FLEXPEN 100 UNIT/ML injection pen; Correction scale 5/50>150 (up to 30 units daily)  -     JARDIANCE 25 MG tablet; Take 1 tablet by mouth daily  -     TRESIBA FLEXTOUCH 200 UNIT/ML SOPN; Start 116 units daily, increase by 4 units every 4 days until FBG , max daily 150  -     OZEMPIC, 0.25 OR 0.5 MG/DOSE, 2 MG/1.5ML SOPN; Inject 0.5 mg into the skin once a week  -     ACCU-CHEK GUIDE strip; Check blood glucose three times daily. Dx E11.65  -     methIMAzole (TAPAZOLE) 5 MG tablet; Take 0.5 tablets by mouth daily  -     Fructosamine; Future  -     TSH; Future  -     T3; Future  -     T4, Free; Future  -     CBC with Auto Differential; Future  -     Hepatic Function Panel; Future  -     Insulin Pen Needle 32G X 6 MM MISC; Use with insulin up to 4 times daily, E11.65  -     Basic Metabolic Panel; Future    Subclinical hyperthyroidism  -     TSH; Future  -     T3; Future  -     T4, Free; Future  -     Hepatic Function Panel; Future    Essential hypertension    Vitamin D deficiency  -     Vitamin D 25 Hydroxy; Future    Mixed hyperlipidemia  -     rosuvastatin (CRESTOR) 40 MG tablet; Take 1 tablet by mouth every evening    Morbid obesity with BMI of 60.0-69.9, adult ([facility]            History of Present Illness:    6/1/2022  Deviously controlled type 2 diabetes status post bariatric gastric sleeve surgery, after gastric sleeve multiple medications were discontinued, previously SGLT2 I was restarted due to history of CHF with low EF, basal insulin was restarted but uptitrated to 116 units. He is taking NovoLog by correction however is only monitoring his blood sugar once or twice per day, often fasting limiting efficacy and use.   He was started on Ozempic by another provider 3 weeks ago and has taken 3 doses of the 0.25 titration dose with good tolerance of medication. He walks his dogs around his house a few times per day but admits that portion size remains a major barrier to care and is often eating moderately healthy foods in large quantities       Current Regimen: Tresiba 116 (uptitrated from 40 units since re-starting Dec 2021), Novolog by correction 5/50>150,  Jardiance 25mg, ozempic 0.25mg        1/28/2022   Returns to clinic for late follow-up, has not been seen in greater than 1 year. Is status post gastric sleeve. After gastric sleeve surgery medications were discontinued. At previous visit SGLT2 I was restarted due to history of CHF with low EF. Patient  was only able to obtain [facility] and PA was started to ensure he was able to get most appropriate agent Jardiance. Review of records shows drastically worsening A1c since last visit      Admits to some compliance problems      Was RX'd ozempic in Dec 2021 was unable to obtain      Current Regimen: Tresiba 63 (uptitrated from 40 units since re-starting Dec 2021), Novolog by correction 5/50>150,  Jardiance 25mg             DIABETES           Date of Diagnosis: Type 2 diabetes mellitus diagnosed 6/2017.       09/25/2019   Patient returns clinic for follow-up visit status post gastric bypass on Jardiance and NovoLog by correction. Alec Jesus is increasing his activity, attempting to follow a strict diet, and is losing weight.  Patient has stable glycemic control without  side effects of medication and without hypoglycemia.  Patient is tolerating current treatment regimen with hemoglobin A1c at goal.       Additionally, patient was found to have suppressed TSH with negative TSI and unremarkable thyroid ultrasound.  He is on 2.5 mg of methimazole with improvement of his thyroid function tests, no change in condition       05/14/2020   Patovinayak Loop a 30 y. o. male who was seen by synchronous (real-time) audio-video technology on 05/14/2020 .  The patient provided consent for this audio-video interaction.  The Mobile Action platform was used.       Pt meets for virtual follow up. Doing well but limited exercise and some poor diet choices. Pt reports that PCP changed from Paul Dodd to steglatro due to cost and access       9/4/2020   VIRTUAL VISIT   Magalene Lundborg a 30 y. o. male who was seen by synchronous (real-time) audio-video technology on 9/4/2020 .  The patient provided consent for this audio-video interaction.  The Mobile Action platform was used. Patient and provider were located  at their individual homes.        Pt on steglatro instead of jardiance due to cost, notes worsening of control since changing to [employer]. Is attempting to eat more healthy, less canned food, trying walk but suffers SOB,          Diet: Now on nector shakes,    3-5 small meal. He tries to avoid sweets for the most part.  No sweet tea or regular soft drinks.  He is trying to limit carbohydrates.  He eats >3000 calories per day (he previously ate >10,000 calories per day).          Exercise: walking, 3 days per week, and chair exercises       Monitoring: One Touch Ultra Mini         Home blood glucose monitoring frequency: 1.1 times per day    By review of meter download over past 30 days  Average blood glucose 355 ± 80  Range 225-499     Typical Standard Deviation   Fasting 329 69   AC lunch 455 29   AC supper 360 62   Bedtime 478 0     Blood glucose levels are uncontrolled, most significant elevations are constant     Hypoglycemia: +Hypoglycemic awareness.  No recent episodes.       Hemoglobin A1c:   8/14/2017: 11.9.   11/14/2017: 5.8.   3/7/2018: 5.8.   7/16/2018: 6.2.   10/15/2018: 5.1   03/07/2019: 5.1%   06/07/2019: 5.7%   09/25/2019: 5.8%      12/07/2021: 12.0%  03/08/2022: 8.4%               Microalbumin/Nephropathy:   8/14/2017: Creatinine 0.89 (), urine microalbumin/creatinine 14.8.   10/15/2018      Cr 1.15, GFR 87, microalbumin/Cr ratio 5.7 12/06/2018      Cr 1.30, GFR >60   06/07/2019      Cr 1.05, GFR 97   12/07/2021     Cr 0.81,   03/08/2022 Cr 0.90, , microalbumin/Cr ratio 15             Neuropathy: Denies burning, numbness, tingling of feet.       Retinopathy: Eye exam Early 2022 - no retinopathy.        Lipids: He is tolerating rosuvastatin and fenofibrate. 8/14/2017: Total cholesterol 143, triglycerides 380, HDL 34, LDL 33, VLDL 76, AST 60, .   10/05/2018  TC- 132, LDL- 59, VLDL- 38,  HDL- 35, TG- 188    03/08/2022  TC- 190, LDL- 113, VLDL- 37,  HDL- 40, TG- 213         TSH:   4/11/2016: TSH 0.808.   2/21/2018: TSH 0.768, free T4 1.20.   10/15/2018: TSH 0.362, free  T4 1.44   11/02/2018: TSH 0.280, free  T4 1.36   03/07/2019: TSH 0.402, free T4 1.19, total T3 107    06/07/2019: TSH 0.811, Free T4 133, total T3 141 (AST 18, ALT 24) ( on 2.5mg methimazole)  03/08/2022: TSH 0.722, free T4 1.23                          Thyroid Antibodies               TPO     79 (negative)                           11/02/2018                     TSI       <0.10 (negative)                      12/06/2018               Thyroglobulin ab <1.0 (negative)        11/02/2018 11/12/2018   Thyroid ultrasound.       CLINICAL INDICATION: Abnormal thyroid function tests       PROCEDURE: Real-time grayscale sonographic evaluation of the thyroid gland with   color Doppler interrogation.       COMPARISON: No prior       FINDINGS:       RIGHT THYROID LOBE: The right thyroid lobe measures 6.2 x 2.9 x 2.3 cm.  The   echotexture is homogeneous. No nodules identified.       LEFT THYROID LOBE:  The left thyroid lobe measures 6.2 x 2.6 x 2.5 cm.   The   echotexture is homogeneous.  No nodules identified.       The isthmus measures 5 mm        IMPRESSION   IMPRESSION: Unremarkable thyroid ultrasound.           Prior Treatment: He previously took Annetta Mess 170 units at bedtime, Novolog 18 units with meals, metformin 1000 mg twice a day. He has tolerated both victoza and jardiance       He was hospitalized at [address] [place] [address] secondary to congestive heart failure.  He weighed 680 pounds at the time of his admission. [facility] has been evaluated by bariatric surgery (Dr. Rupinder Cotter) and gastric  bypass has been recommended, gastric sleeve performed 12/2018.              Allergies & Medications:  Reviewed in chart. Review of Systems    Vital Signs:  /62 (Site: Left Lower Arm, Position: Sitting, Cuff Size: Large Adult)   Pulse 89   Ht 5' 6\" (1.676 m)   Wt (!) 384 lb (174.2 kg)   SpO2 99%   BMI 61.98 kg/m²       Physical Exam  Constitutional:       Appearance: Normal appearance. He is obese. Neck:      Thyroid: No thyromegaly. Cardiovascular:      Rate and Rhythm: Normal rate and regular rhythm. Pulmonary:      Effort: Pulmonary effort is normal.      Breath sounds: Normal breath sounds. Abdominal:      Palpations: Abdomen is soft. Feet:      Right foot:      Protective Sensation: 3 sites tested. 3 sites sensed. Left foot:      Protective Sensation: 3 sites tested. 3 sites sensed. Lymphadenopathy:      Cervical: No cervical adenopathy. Skin:     General: Skin is warm and dry. Neurological:      General: No focal deficit present. Mental Status: He is alert. Psychiatric:         Mood and Affect: Mood normal.         Behavior: Behavior normal.         Thought Content: Thought content normal.         Judgment: Judgment normal.             Return in about 3 months (around 9/1/2022) for Diabetes DM2 Follow-Up. Portions of this note were generated with the assistance of voice recogniton software. As such, some errors in transcription may be present.

## 2022-06-01 NOTE — PROGRESS NOTES
[employer] message:      Mr. Lorrie Sawyer remains markedly low, start 2000 international units of vitamin D3 either from pharmacy as sent to [address]  or purchase over-the-counter. Take daily with food for best absorption    ALSO, your sugar is very high. Please increase the ozempic as we discussed and check your readings more often, before supper and before bed so you can benefit from the correction scale insulin. Keep walking those dogs and drinking water to flush out your system.  Try to reduce your portion sizes by a small but consistent amount at every meal and snack (especially the carbs!)    Take care,  ~Meron

## 2022-06-03 LAB — FRUCTOSAMINE SERPL-SCNC: 428 UMOL/L (ref 0–285)

## 2022-06-06 ENCOUNTER — PHARMACY VISIT (OUTPATIENT)
Dept: CARDIOLOGY CLINIC | Age: 32
End: 2022-06-06

## 2022-06-06 DIAGNOSIS — I50.22 CHRONIC SYSTOLIC (CONGESTIVE) HEART FAILURE (HCC): Primary | ICD-10-CM

## 2022-06-13 ENCOUNTER — OFFICE VISIT (OUTPATIENT)
Dept: CARDIOLOGY CLINIC | Age: 32
End: 2022-06-13
Payer: COMMERCIAL

## 2022-06-13 VITALS
WEIGHT: 315 LBS | HEIGHT: 66 IN | HEART RATE: 88 BPM | DIASTOLIC BLOOD PRESSURE: 80 MMHG | BODY MASS INDEX: 50.62 KG/M2 | SYSTOLIC BLOOD PRESSURE: 112 MMHG

## 2022-06-13 DIAGNOSIS — I10 ESSENTIAL HYPERTENSION: ICD-10-CM

## 2022-06-13 DIAGNOSIS — E11.9 TYPE 2 DIABETES MELLITUS WITHOUT COMPLICATION, WITHOUT LONG-TERM CURRENT USE OF INSULIN (HCC): ICD-10-CM

## 2022-06-13 DIAGNOSIS — I50.22 CHRONIC SYSTOLIC (CONGESTIVE) HEART FAILURE (HCC): Primary | ICD-10-CM

## 2022-06-13 DIAGNOSIS — E78.2 MIXED HYPERLIPIDEMIA: ICD-10-CM

## 2022-06-13 PROCEDURE — 99214 OFFICE O/P EST MOD 30 MIN: CPT | Performed by: INTERNAL MEDICINE

## 2022-06-13 PROCEDURE — 3052F HG A1C>EQUAL 8.0%<EQUAL 9.0%: CPT | Performed by: INTERNAL MEDICINE

## 2022-06-13 NOTE — PROGRESS NOTES
Received notice from [facility] [address] [facility] via [address]). The request for Corlanor 5mg BID has been APPROVED. Authorization is valid from 6/06/2022 through 6/05/2023. Authorization number is [id number] (member ID [id number]). We had submitted PA request via [employer] in May (see Clinical Documentation 5/10/2022). Also received PA request via "[employer].   PA has been approved./[email or web address]

## 2022-06-13 NOTE — PROGRESS NOTES
 Type 2 diabetes mellitus ([facility] [address]      Past Surgical History:   Procedure Laterality Date    HX LAP GASTRIC BYPASS  12/26/2018    gastric     HX TONSILLECTOMY       Family History   Problem Relation Age of Onset   [place] Sicard Stroke Mother     Hypertension Mother     Diabetes Mother     Heart Disease Father     Hypertension Father     Diabetes Father     Heart Disease Maternal Grandmother     Hypertension Maternal Grandmother     Heart Disease Maternal Grandfather     Hypertension Maternal Grandfather     Heart Disease Paternal Grandmother     Hypertension Paternal Grandmother     Heart Disease Paternal Grandfather     Hypertension Paternal Grandfather     Diabetes Paternal Grandfather       Social History     Tobacco Use    Smoking status: Never Smoker    Smokeless tobacco: Never Used   Substance Use Topics    Alcohol use: No     [unfilled]    Current Outpatient Medications:     sodium fluoride-pot nitrate 1.1-5 % pste, BRUSH TEETH FOR 2 MINUTES TWICE A DAY. EXPECTORATE. DO NOT EAT OR DINK AFTER BRUSHING, Disp: , Rfl:     Jardiance 25 mg tablet, Take 1 Tablet by mouth daily. , Disp: 90 Tablet, Rfl: 3    Ozempic 0.25 mg or 0.5 mg/dose (2 mg/1.5 ml) subq pen, 0.5 mg by SubCUTAneous route every seven (7) days. Start 0.25mg weekly for four weeks before increasing to 0.5mg weekly, Disp: 3 Box, Rfl: 3    NovoLOG Flexpen U-100 Insulin 100 unit/mL (3 mL) inpn, Correction scale 5/50>150 (up to 30 units daily), Disp: 5 Pen, Rfl: 11    Matilde Pen Needle 32 gauge x 5/32\" ndle, Use with insulin up to 4 times daily, E11.65, Disp: 400 Pen Needle, Rfl: 3    OneTouch Delica Lancets 30 gauge misc, 3 times a day (E11.65), Disp: 100 Lancet, Rfl: 11    One Touch Delica kit, 3 times a day (E11.65), Disp: 1 Kit, Rfl: 1    methIMAzole (TAPAZOLE) 5 mg tablet, Take 0.5 Tablets by mouth daily. , Disp: 45 Tablet, Rfl: 3    rosuvastatin (CRESTOR) 40 mg tablet, Take 1 Tablet by mouth nightly., Disp: 90 Tablet, Rfl: 1    carvediloL (COREG) 25 mg tablet, TAKE 1 TABLET BY MOUTH TWICE DAILY WITH MEALS, Disp: 60 Tablet, Rfl: 5    fenofibrate nanocrystallized (TRICOR) 145 mg tablet, Take 1 Tablet by mouth daily. , Disp: 30 Tablet, Rfl: 5    furosemide (LASIX) 20 mg tablet, Take 1 tablet by mouth once daily, Disp: 30 Tablet, Rfl: 5    insulin degludec Garth Pacer FlexTouch U-200) 200 unit/mL (3 mL) inpn pen, 40 units daily (Patient taking differently: 63 Units. 40 units daily), Disp: 5 Adjustable Dose Pre-filled Pen Syringe, Rfl: 11    ivabradine (CORLANOR) 5 mg tablet, Take 1 Tablet by mouth two (2) times daily (with meals). , Disp: 180 Tablet, Rfl: 3    lisinopriL (PRINIVIL, ZESTRIL) 40 mg tablet, Take 0.5 Tablets by mouth daily. , Disp: 30 Tablet, Rfl: 11    glucose blood VI test strips (Accu-Chek Guide test strips) strip, Test Blood Sugar TID, Disp: 300 Strip, Rfl: 5    glucose blood VI test strips (Accu-Chek Cindy Plus test strp) strip, Test blood sugar tid, Disp: 200 Strip, Rfl: 5    CALCIUM PO, Take  by mouth., Disp: , Rfl:     fluticasone propionate (FLONASE) 50 mcg/actuation nasal spray, 2 Sprays by Both Nostrils route daily. , Disp: 1 Bottle, Rfl: 5    Blood-Glucose Meter (ACCU-CHEK CINDY PLUS METER) [facility], One unit, Disp: 1 Each, Rfl: 0    Blood-Glucose Meter (ONETOUCH ULTRA2) monitoring kit, Use for managing diabetes type 2 on insulin, Disp: 1 Kit, Rfl: 0    cyanocobalamin 1,000 mcg tablet, Take 1,000 mcg by mouth., Disp: , Rfl:     multivitamin (ONE A DAY) tablet, Take 1 Tab by mouth daily. , Disp: , Rfl:     Vascepa 1 gram capsule, Take 2 Capsules by mouth two (2) times daily (with meals).  (Patient not taking: Reported on 4/26/2022), Disp: 360 Capsule, Rfl: 3    miscellaneous medical supply [facility], All supplies needed for bipap (Patient not taking: Reported on 4/26/2022), Disp: , Rfl:         ROS:  Review of Systems - General ROS: negative for - chills, fatigue or fever  Hematological and Lymphatic ROS: negative for - bleeding problems, bruising or jaundice  Respiratory ROS: no cough, shortness of breath, or wheezing  Cardiovascular ROS: no chest pain or dyspnea on exertion  Gastrointestinal ROS: no abdominal pain, change in bowel habits, or black or bloody stools  Neurological ROS: no TIA or stroke symptoms  All other systems negative. Wt Readings from Last 3 Encounters:   04/26/22 (!) 391 lb (177.4 kg)   01/28/22 (!) 353 lb (160.1 kg)   12/07/21 334 lb 12.8 oz (151.9 kg)     Temp Readings from Last 3 Encounters:   12/07/21 97.2 °F (36.2 °C) (Temporal)   01/29/20 97.4 °F (36.3 °C) (Oral)   09/12/19 98.2 °F (36.8 °C) (Oral)     BP Readings from Last 3 Encounters:   04/26/22 130/82   01/28/22 120/72   12/07/21 120/70     Pulse Readings from Last 3 Encounters:   04/26/22 84   01/28/22 83   12/07/21 93           PHYSICAL EXAM:  Visit Vitals  /82   Pulse 84   Ht 5' 6\" (1.676 m)   Wt (!) 391 lb (177.4 kg)   BMI 63.11 kg/m²       Physical Examination: General appearance - alert, well appearing, and in no distress  Mental status - alert, oriented to person, place, and time  Eyes - pupils equal and reactive, extraocular eye movements intact  Neck/lymph - supple, no significant adenopathy  Chest/lungs - clear to auscultation, no wheezes, rales or rhonchi, symmetric air entry  Heart/CV - normal rate, regular rhythm, normal S1, S2, no murmurs, rubs, clicks or gallops  Abdomen/GI - soft, nontender, nondistended, no masses or organomegaly  Musculoskeletal - no joint tenderness, deformity or swelling  Extremities - peripheral pulses normal, no pedal edema, no clubbing or cyanosis  Skin - normal coloration and turgor, no rashes, no suspicious skin lesions noted    EKG: normal EKG, normal sinus rhythm, unchanged from previous tracings. Medications reviewed and questions answered    No results found for this or any previous visit (from the past 672 hour(s)).   Lab Results   Component Value Date/Time    Cholesterol, total 190 03/08/2022 08:40 AM    HDL Cholesterol 40 03/08/2022 08:40 AM    LDL, calculated 113 (H) 03/08/2022 08:40 AM    LDL, calculated 39 10/15/2018 11:37 AM    VLDL, calculated 37 03/08/2022 08:40 AM    VLDL, calculated 57 (H) 10/15/2018 11:37 AM    Triglyceride 213 (H) 03/08/2022 08:40 AM    CHOL/HDL Ratio 4.3 04/11/2016 05:35 PM         ASSESSMENT and PLAN        1. Chronic systolic congestive heart failure (HCC)  - Corelnor 10mg daily  -Farxiga 10mg daily  - current regimen effective; continue current regimen    2. Type 2 diabetes mellitus without complication, unspecified whether long term insulin use (HCC)  - current regimen effective; continue current regimen    3. Mixed hyperlipidemia  - current regimen effective; continue current regimen    4. Essential hypertension  - current regimen effective; continue current regimen    5. Morbid obesity with body mass index of 50 or higher (HCC)  - current regimen effective; continue current regimen    6. Gastric bypass status for obesity  - continue to monitor          No orders of the defined types were placed in this encounter.                 Carmelita Bueno MD  4/26/2022  11:40 AM

## 2022-07-05 DIAGNOSIS — E11.65 UNCONTROLLED TYPE 2 DIABETES MELLITUS WITH HYPERGLYCEMIA (HCC): ICD-10-CM

## 2022-07-05 DIAGNOSIS — I10 ESSENTIAL HYPERTENSION: Primary | ICD-10-CM

## 2022-07-05 RX ORDER — BLOOD SUGAR DIAGNOSTIC
STRIP MISCELLANEOUS
Qty: 300 EACH | Refills: 3 | Status: SHIPPED | OUTPATIENT
Start: 2022-07-05 | End: 2022-08-29 | Stop reason: SDUPTHER

## 2022-07-05 RX ORDER — LISINOPRIL 40 MG/1
20 TABLET ORAL DAILY
Qty: 90 TABLET | Refills: 1 | Status: SHIPPED | OUTPATIENT
Start: 2022-07-05 | End: 2022-07-19 | Stop reason: SDUPTHER

## 2022-07-05 NOTE — TELEPHONE ENCOUNTER
Requested Prescriptions     Pending Prescriptions Disp Refills    ACCU-CHEK GUIDE strip 300 each 3     Sig: Check blood glucose three times daily.  Dx E11.65    lisinopril (PRINIVIL;ZESTRIL) 40 MG tablet 90 tablet 1     Sig: Take 0.5 tablets by mouth daily

## 2022-07-12 ENCOUNTER — TELEPHONE (OUTPATIENT)
Dept: ENDOCRINOLOGY | Age: 32
End: 2022-07-12

## 2022-07-12 NOTE — TELEPHONE ENCOUNTER
Patient called and was concerned about getting his medications filled. Patient stated that he was told he was in need of a prior authorization for his Ceclia [place], Lisinopril, and Jardiance. Called patient's pharmacy on file. Pharmacist stated that patient's Ceclia [place], Lisinopril are going through with no problem Patient's insurance will only pay for a 30 day supply of Jardiance at a time. patient currently has a 90 day prescription and they stated they could change this to 30 days. Called patient's mother and advised her that patient should be able to get prescriptions with no prior authorization. Told her to call back with any issues. Patient's mother verbalized understanding.

## 2022-07-19 DIAGNOSIS — I10 ESSENTIAL HYPERTENSION: ICD-10-CM

## 2022-07-19 DIAGNOSIS — E11.65 UNCONTROLLED TYPE 2 DIABETES MELLITUS WITH HYPERGLYCEMIA (HCC): ICD-10-CM

## 2022-07-19 DIAGNOSIS — E78.2 MIXED HYPERLIPIDEMIA: ICD-10-CM

## 2022-07-19 RX ORDER — LISINOPRIL 40 MG/1
20 TABLET ORAL DAILY
Qty: 90 TABLET | Refills: 3 | Status: SHIPPED | OUTPATIENT
Start: 2022-07-19 | End: 2022-11-04 | Stop reason: SDUPTHER

## 2022-07-19 RX ORDER — ROSUVASTATIN CALCIUM 40 MG/1
40 TABLET, COATED ORAL EVERY EVENING
Qty: 90 TABLET | Refills: 3 | Status: SHIPPED | OUTPATIENT
Start: 2022-07-19

## 2022-07-19 RX ORDER — CARVEDILOL 25 MG/1
25 TABLET ORAL 2 TIMES DAILY
Qty: 180 TABLET | Refills: 3 | Status: SHIPPED | OUTPATIENT
Start: 2022-07-19 | End: 2022-11-04 | Stop reason: SDUPTHER

## 2022-07-19 RX ORDER — SPIRONOLACTONE 25 MG/1
25 TABLET ORAL DAILY
Qty: 90 TABLET | Refills: 3 | Status: SHIPPED | OUTPATIENT
Start: 2022-07-19 | End: 2022-11-04 | Stop reason: SDUPTHER

## 2022-07-19 RX ORDER — FENOFIBRATE 145 MG/1
145 TABLET, COATED ORAL DAILY
Qty: 90 TABLET | Refills: 3 | Status: SHIPPED | OUTPATIENT
Start: 2022-07-19

## 2022-07-19 RX ORDER — EMPAGLIFLOZIN 25 MG/1
25 TABLET, FILM COATED ORAL DAILY
Qty: 90 TABLET | Refills: 3 | Status: SHIPPED | OUTPATIENT
Start: 2022-07-19

## 2022-07-19 RX ORDER — FUROSEMIDE 20 MG/1
20 TABLET ORAL DAILY
Qty: 90 TABLET | Refills: 3 | Status: SHIPPED | OUTPATIENT
Start: 2022-07-19

## 2022-07-19 RX ORDER — ICOSAPENT ETHYL 1000 MG/1
2 CAPSULE ORAL 2 TIMES DAILY WITH MEALS
Qty: 180 CAPSULE | Refills: 3 | Status: SHIPPED | OUTPATIENT
Start: 2022-07-19

## 2022-07-19 NOTE — TELEPHONE ENCOUNTER
Wants all his Rxs refilled and sent to   [facility]  [phone]

## 2022-08-01 ENCOUNTER — TELEMEDICINE (OUTPATIENT)
Dept: INTERNAL MEDICINE CLINIC | Facility: CLINIC | Age: 32
End: 2022-08-01
Payer: COMMERCIAL

## 2022-08-01 DIAGNOSIS — U07.1 COVID-19: Primary | ICD-10-CM

## 2022-08-01 PROCEDURE — 99447 NTRPROF PH1/NTRNET/EHR 11-20: CPT | Performed by: INTERNAL MEDICINE

## 2022-08-01 ASSESSMENT — ENCOUNTER SYMPTOMS
ABDOMINAL DISTENTION: 0
CHEST TIGHTNESS: 0
WHEEZING: 0
PHOTOPHOBIA: 0
SHORTNESS OF BREATH: 0
RHINORRHEA: 1

## 2022-08-01 NOTE — PROGRESS NOTES
Leandro Albert is a 28 y.o. male evaluated via audio-only technology on 8/1/2022 for Concern For COVID-19 (Tested + for COVID. Hot and cold, coughing really bad, nasal congestion.  )    Concerned about nasal congestion, not associated with shortness of breath, but cough, he denies any fever, chills, tested positive for COVID on Saturday, has a complex medical history including congestive heart failure, diabetes  Is able to speak full sentences without getting short of breath  Fully vaccinated    Assessment & Plan:   COVID-19  Unfortunately, he is currently taking medications that can interact with Paxlovid, he is taking Corlanor and this medication should be avoided due to the high risk of interactions. We discussed about the possibility of monoclonal antibody treatment, he would be a good candidate, as he has serious conditions. He decided to wait for 1 to 2 days, and if his symptoms get worse, she will go to the emergency room to get treatment for acute COVID infection. Return if symptoms worsen or fail to improve. Subjective:       Prior to Admission medications    Medication Sig Start Date End Date Taking? Authorizing Provider   lisinopril (PRINIVIL;ZESTRIL) 40 MG tablet Take 0.5 tablets by mouth in the morning. 7/19/22  Yes Tom Sibley MD   spironolactone (ALDACTONE) 25 MG tablet Take 1 tablet by mouth in the morning. 7/19/22  Yes Tom Sibley MD   JARDIANCE 25 MG tablet Take 1 tablet by mouth in the morning. 7/19/22  Yes Tom Sibley MD   rosuvastatin (CRESTOR) 40 MG tablet Take 1 tablet by mouth every evening 7/19/22  Yes Tom Sibley MD   carvedilol (COREG) 25 MG tablet Take 1 tablet by mouth in the morning and 1 tablet before bedtime. 7/19/22  Yes Tom Sibley MD   fenofibrate (TRICOR) 145 MG tablet Take 1 tablet by mouth in the morning. 7/19/22  Yes Tom Sibley MD   furosemide (LASIX) 20 MG tablet Take 1 tablet by mouth in the morning.  7/19/22  Yes Tom Sibley MD Mixed hyperlipidemia     Morbid obesity (HCC)     NAFLD (nonalcoholic fatty liver disease)     MARK (obstructive sleep apnea)     [facility] 2 diabetes [facility] ([facility]      Review of Systems   Constitutional:  Negative for activity change, fatigue and unexpected weight change. HENT:  Positive for congestion and rhinorrhea. Eyes:  Negative for photophobia and visual disturbance. Respiratory:  Negative for chest tightness, shortness of breath and wheezing. Cardiovascular:  Negative for chest pain, palpitations and leg swelling. Gastrointestinal:  Negative for abdominal distention. Genitourinary:  Negative for difficulty urinating and frequency. Musculoskeletal:  Negative for myalgias. Neurological:  Negative for dizziness and headaches. No data recorded    Ruthell Bloch was evaluated through a patient-initiated, synchronous (real-time) audio only encounter. He (or guardian if applicable) is aware that it is a billable service, which includes applicable co-pays, with coverage as determined by his insurance carrier. This visit was conducted with the patient's (and/or Elton Martin guardian's) verbal consent. He has not had a related appointment within my department in the past 7 days or scheduled within the next 24 hours. The patient was located in a state where the provider was licensed to provide care. The patient was located at: Home: [address] [place] [id number]  The provider was located at:  Facility (Appt Dept): [address]    Total Time: minutes: 11-20 minutes    Joselo León MD

## 2022-08-29 ENCOUNTER — OFFICE VISIT (OUTPATIENT)
Dept: INTERNAL MEDICINE CLINIC | Facility: CLINIC | Age: 32
End: 2022-08-29
Payer: COMMERCIAL

## 2022-08-29 VITALS
WEIGHT: 315 LBS | HEIGHT: 66 IN | BODY MASS INDEX: 50.62 KG/M2 | DIASTOLIC BLOOD PRESSURE: 62 MMHG | SYSTOLIC BLOOD PRESSURE: 100 MMHG | HEART RATE: 95 BPM | RESPIRATION RATE: 20 BRPM | OXYGEN SATURATION: 98 %

## 2022-08-29 DIAGNOSIS — E11.65 UNCONTROLLED TYPE 2 DIABETES MELLITUS WITH HYPERGLYCEMIA (HCC): ICD-10-CM

## 2022-08-29 DIAGNOSIS — I50.9 CHRONIC CONGESTIVE HEART FAILURE, UNSPECIFIED HEART FAILURE TYPE (HCC): ICD-10-CM

## 2022-08-29 DIAGNOSIS — Z11.9 SCREENING EXAMINATION FOR INFECTIOUS DISEASE: ICD-10-CM

## 2022-08-29 DIAGNOSIS — Z11.9 SCREENING EXAMINATION FOR INFECTIOUS DISEASE: Primary | ICD-10-CM

## 2022-08-29 DIAGNOSIS — I10 ESSENTIAL HYPERTENSION: ICD-10-CM

## 2022-08-29 DIAGNOSIS — Z98.84 GASTRIC BYPASS STATUS FOR OBESITY: ICD-10-CM

## 2022-08-29 PROBLEM — K75.81 NASH (NONALCOHOLIC STEATOHEPATITIS): Status: RESOLVED | Noted: 2018-10-03 | Resolved: 2022-08-29

## 2022-08-29 PROBLEM — D53.9 NUTRITIONAL ANEMIA, UNSPECIFIED: Status: ACTIVE | Noted: 2018-10-03

## 2022-08-29 PROBLEM — E55.9 VITAMIN D DEFICIENCY: Status: ACTIVE | Noted: 2019-04-01

## 2022-08-29 PROBLEM — E11.9 DIABETES TYPE 2, NO OCULAR INVOLVEMENT (HCC): Status: ACTIVE | Noted: 2017-08-28

## 2022-08-29 PROBLEM — H50.15 ALTERNATING EXOTROPIA: Status: ACTIVE | Noted: 2019-04-24

## 2022-08-29 PROBLEM — K91.2 MALNUTRITION FOLLOWING GASTROINTESTINAL SURGERY: Status: ACTIVE | Noted: 2019-01-03

## 2022-08-29 PROBLEM — G47.33 OSA (OBSTRUCTIVE SLEEP APNEA): Status: ACTIVE | Noted: 2017-09-13

## 2022-08-29 PROBLEM — E05.90 SUBCLINICAL HYPERTHYROIDISM: Status: ACTIVE | Noted: 2019-03-15

## 2022-08-29 LAB
ALBUMIN SERPL-MCNC: 4 G/DL (ref 3.5–5)
ALBUMIN/GLOB SERPL: 1 {RATIO} (ref 1.2–3.5)
ALP SERPL-CCNC: 88 U/L (ref 50–136)
ALT SERPL-CCNC: 45 U/L (ref 12–65)
ANION GAP SERPL CALC-SCNC: 11 MMOL/L (ref 7–16)
AST SERPL-CCNC: 19 U/L (ref 15–37)
BASOPHILS # BLD: 0.1 K/UL (ref 0–0.2)
BASOPHILS NFR BLD: 1 % (ref 0–2)
BILIRUB SERPL-MCNC: 0.6 MG/DL (ref 0.2–1.1)
BUN SERPL-MCNC: 19 MG/DL (ref 6–23)
CALCIUM SERPL-MCNC: 9.4 MG/DL (ref 8.3–10.4)
CHLORIDE SERPL-SCNC: 100 MMOL/L (ref 98–107)
CO2 SERPL-SCNC: 26 MMOL/L (ref 21–32)
CREAT SERPL-MCNC: 1.1 MG/DL (ref 0.8–1.5)
DIFFERENTIAL METHOD BLD: ABNORMAL
EOSINOPHIL # BLD: 0.2 K/UL (ref 0–0.8)
EOSINOPHIL NFR BLD: 3 % (ref 0.5–7.8)
ERYTHROCYTE [DISTWIDTH] IN BLOOD BY AUTOMATED COUNT: 14.6 % (ref 11.9–14.6)
EST. AVERAGE GLUCOSE BLD GHB EST-MCNC: 214 MG/DL
GLOBULIN SER CALC-MCNC: 3.9 G/DL (ref 2.3–3.5)
GLUCOSE SERPL-MCNC: 231 MG/DL (ref 65–100)
HBA1C MFR BLD: 9.1 % (ref 4.8–5.6)
HCT VFR BLD AUTO: 50.9 % (ref 41.1–50.3)
HCV AB SER QL: NONREACTIVE
HGB BLD-MCNC: 16.3 G/DL (ref 13.6–17.2)
HIV 1+2 AB+HIV1 P24 AG SERPL QL IA: NONREACTIVE
HIV 1/2 RESULT COMMENT: NORMAL
IMM GRANULOCYTES # BLD AUTO: 0.1 K/UL (ref 0–0.5)
IMM GRANULOCYTES NFR BLD AUTO: 1 % (ref 0–5)
LYMPHOCYTES # BLD: 2.2 K/UL (ref 0.5–4.6)
LYMPHOCYTES NFR BLD: 29 % (ref 13–44)
MCH RBC QN AUTO: 28.6 PG (ref 26.1–32.9)
MCHC RBC AUTO-ENTMCNC: 32 G/DL (ref 31.4–35)
MCV RBC AUTO: 89.3 FL (ref 79.6–97.8)
MONOCYTES # BLD: 0.5 K/UL (ref 0.1–1.3)
MONOCYTES NFR BLD: 6 % (ref 4–12)
NEUTS SEG # BLD: 4.7 K/UL (ref 1.7–8.2)
NEUTS SEG NFR BLD: 60 % (ref 43–78)
NRBC # BLD: 0 K/UL (ref 0–0.2)
PLATELET # BLD AUTO: 293 K/UL (ref 150–450)
PMV BLD AUTO: 10.7 FL (ref 9.4–12.3)
POTASSIUM SERPL-SCNC: 3.9 MMOL/L (ref 3.5–5.1)
PROT SERPL-MCNC: 7.9 G/DL (ref 6.3–8.2)
RBC # BLD AUTO: 5.7 M/UL (ref 4.23–5.6)
SODIUM SERPL-SCNC: 137 MMOL/L (ref 136–145)
WBC # BLD AUTO: 7.8 K/UL (ref 4.3–11.1)

## 2022-08-29 PROCEDURE — 3052F HG A1C>EQUAL 8.0%<EQUAL 9.0%: CPT | Performed by: INTERNAL MEDICINE

## 2022-08-29 PROCEDURE — 99214 OFFICE O/P EST MOD 30 MIN: CPT | Performed by: INTERNAL MEDICINE

## 2022-08-29 RX ORDER — BLOOD SUGAR DIAGNOSTIC
STRIP MISCELLANEOUS
Qty: 300 EACH | Refills: 3 | Status: SHIPPED | OUTPATIENT
Start: 2022-08-29

## 2022-08-29 ASSESSMENT — ENCOUNTER SYMPTOMS
CHEST TIGHTNESS: 0
ABDOMINAL DISTENTION: 0
SHORTNESS OF BREATH: 0
PHOTOPHOBIA: 0
WHEEZING: 0

## 2022-08-29 NOTE — ASSESSMENT & PLAN NOTE
Patient will continue following with cardiology, currently tolerating the medications,  Encourage good control of the diabetes, and trying to continue efforts to lose weight. Key CAD CHF Meds          lisinopril (PRINIVIL;ZESTRIL) 40 MG tablet (Taking)    Sig - Route: Take 0.5 tablets by mouth in the morning. - Oral    spironolactone (ALDACTONE) 25 MG tablet (Taking)    Sig - Route: Take 1 tablet by mouth in the morning. - Oral    rosuvastatin (CRESTOR) 40 MG tablet (Taking)    Sig - Route: Take 1 tablet by mouth every evening - Oral    carvedilol (COREG) 25 MG tablet (Taking)    Sig - Route: Take 1 tablet by mouth in the morning and 1 tablet before bedtime. - Oral    fenofibrate (TRICOR) 145 MG tablet (Taking)    Sig - Route: Take 1 tablet by mouth in the morning. - Oral    furosemide (LASIX) 20 MG tablet (Taking)    Sig - Route: Take 1 tablet by mouth in the morning. - Oral    Icosapent Ethyl (VASCEPA) 1 g CAPS capsule (Taking)    Sig - Route: Take 2 capsules by mouth 2 times daily (with meals) - Oral    ivabradine (CORLANOR) 5 MG TABS tablet (Taking)    Sig - Route: Take 1 tablet by mouth in the morning and 1 tablet in the evening.  Take with meals. - Oral    Prior authorization: Closed - Other

## 2022-08-29 NOTE — ASSESSMENT & PLAN NOTE
SW was around 600 lb  Now 46 LB   Encourage him to continue efforts to modify diet , and physical activity

## 2022-08-29 NOTE — PROGRESS NOTES
Chief Complaint   Patient presents with    Follow-up     4 month f/u. Yasmany Linares is a 28 y.o. male who presents today for Follow-up (4 month f/u. )     Since last office visit with me he was seen by cardiology for follow-up in congestive heart failure, and endocrine for diabetes, he also recovered well from COVID-19 infection in August 1. Today he is follow-up for morbid obesity, hypertension, diabetes, chronic conditions,  He reports taking the medications as prescribed,  Currently taking Tresiba up to 152 units, and NovoLog with an average of 60 units a day, his blood sugars at the beginning of the month were in the 300s, but lately his blood sugars has been in the 190s to 280, he continued to have some dietary indiscretions, going out to eat with his family, they like [facility],    He has been trying to increase his physical activity, swimming, walking the dogs, and moving around the house, his weight continues to fluctuate 5 to 10 pounds. But overall he reports no changes in his health,    Also tolerating Ozempic 0.5 mg weekly, and reports no side effects. Currently up-to-date with his vaccinations other than pneumonia, he will check with his mother before he gets the vaccine. He wants to do his labs today    Wt Readings from Last 3 Encounters:   08/29/22 (!) 393 lb 3.2 oz (178.4 kg)   06/13/22 (!) 390 lb (176.9 kg)   06/06/22 (!) 384 lb (174.2 kg)     Vitals:    08/29/22 0754   BP: 100/62   Site: Right Upper Arm   Position: Sitting   Pulse: 95   Resp: 20   SpO2: 98%   Weight: (!) 393 lb 3.2 oz (178.4 kg)   Height: 5' 6\" (1.676 m)        Assessment and plan:  1. Screening examination for infectious disease  -     Hepatitis C Antibody; Future  -     HIV 1/2 Ag/Ab, 4TH Generation,W Rflx Confirm; Future  2.  Uncontrolled type 2 diabetes mellitus with hyperglycemia [facility])  Assessment & Plan:     Lab Results   Component Value Date    LABA1C 8.4 (H) 03/08/2022     Lab Results   Component and 1 tablet before bedtime. - Oral    fenofibrate (TRICOR) 145 MG tablet (Taking)    Sig - Route: Take 1 tablet by mouth in the morning. - Oral    furosemide (LASIX) 20 MG tablet (Taking)    Sig - Route: Take 1 tablet by mouth in the morning. - Oral    Icosapent Ethyl (VASCEPA) 1 g CAPS capsule (Taking)    Sig - Route: Take 2 capsules by mouth 2 times daily (with meals) - Oral    ivabradine (CORLANOR) 5 MG TABS tablet (Taking)    Sig - Route: Take 1 tablet by mouth in the morning and 1 tablet in the evening. Take with meals. - Oral    Prior authorization: Closed - Other            4. Essential hypertension  Assessment & Plan:   Well control  5. Gastric bypass status for obesity  Assessment & Plan:  SW was around 600 lb  [facility] [id number] [facility] to continue efforts to modify diet , and physical activity      Return in about 6 months (around 2/28/2023) for annual.     Review of system:    Review of Systems   Constitutional:  Negative for activity change, fatigue and unexpected weight change. Eyes:  Negative for photophobia and visual disturbance. Respiratory:  Negative for chest tightness, shortness of breath and wheezing. Cardiovascular:  Negative for chest pain, palpitations and leg swelling. Gastrointestinal:  Negative for abdominal distention. Genitourinary:  Negative for difficulty urinating and frequency. Musculoskeletal:  Negative for myalgias. Neurological:  Negative for dizziness and headaches.        Immunization history:    Immunization History   Administered Date(s) Administered    COVID-19, PFIZER PURPLE top, DILUTE for use, (age 15 y+), 30mcg/0.3mL 03/19/2021, 04/11/2021, 11/23/2021    DTP 01/03/1992, 03/06/1992, 05/12/1992, 06/11/1993    Hepatitis A Adult (Havrix, Vaqta) 09/01/2018, 03/01/2019    Hepatitis A Vaccine 09/01/2018, 03/01/2019    Hepatitis B 09/01/2018, 10/01/2018, 03/01/2019    Hepatitis B vaccine 09/01/2018, 10/01/2018, 03/01/2019    Hib vaccine 01/06/1992, 03/06/1992, 05/12/1992, 06/11/1993    Influenza Virus Vaccine 09/01/2018, 09/26/2019, 08/31/2020, 09/13/2021    Influenza, FLUARIX, FLULAVAL, (age 10 mo+) AND AFLURIA, FLUZONE (age 1 y+), PF 09/01/2018, 09/26/2019, 08/31/2020    Influenza, FLUCELVAX, (age 10 mo+), MDCK, PF 08/29/2017    MMR 06/11/1993    Pneumococcal Polysaccharide ([id number]) 04/14/2016    Polio OPV 01/03/1992, 03/06/1992, 06/11/1993    Tdap (Boostrix, Adacel) 09/01/2018       Current medications:      Current Outpatient Medications:     ACCU-CHEK GUIDE strip, Check blood glucose three times daily. Dx E11.65, Disp: 300 each, Rfl: 3    lisinopril (PRINIVIL;ZESTRIL) 40 MG tablet, Take 0.5 tablets by mouth in the morning., Disp: 90 tablet, Rfl: 3    spironolactone (ALDACTONE) 25 MG tablet, Take 1 tablet by mouth in the morning., Disp: 90 tablet, Rfl: 3    JARDIANCE 25 MG tablet, Take 1 tablet by mouth in the morning., Disp: 90 tablet, Rfl: 3    rosuvastatin (CRESTOR) 40 MG tablet, Take 1 tablet by mouth every evening, Disp: 90 tablet, Rfl: 3    carvedilol (COREG) 25 MG tablet, Take 1 tablet by mouth in the morning and 1 tablet before bedtime. , Disp: 180 tablet, Rfl: 3    fenofibrate (TRICOR) 145 MG tablet, Take 1 tablet by mouth in the morning., Disp: 90 tablet, Rfl: 3    furosemide (LASIX) 20 MG tablet, Take 1 tablet by mouth in the morning., Disp: 90 tablet, Rfl: 3    Icosapent Ethyl (VASCEPA) 1 g CAPS capsule, Take 2 capsules by mouth 2 times daily (with meals), Disp: 180 capsule, Rfl: 3    ivabradine (CORLANOR) 5 MG TABS tablet, Take 1 tablet by mouth in the morning and 1 tablet in the evening. Take with meals. , Disp: 180 tablet, Rfl: 3    NOVOLOG FLEXPEN 100 UNIT/ML injection pen, Correction scale 5/50>150 (up to 30 units daily), Disp: 30 mL, Rfl: 3    TRESIBA FLEXTOUCH 200 UNIT/ML SOPN, Start 116 units daily, increase by 4 units every 4 days until FBG , max daily 150, Disp: 69 mL, Rfl: 3    OZEMPIC, 0.25 OR 0.5 MG/DOSE, 2 MG/1.5ML SOPN, Inject 0.5 mg into the skin once a week, Disp: 9 mL, Rfl: 3    methIMAzole (TAPAZOLE) 5 MG tablet, Take 0.5 tablets by mouth daily, Disp: 45 tablet, Rfl: 3    Insulin Pen Needle 32G X 6 MM MISC, Use with insulin up to 4 times daily, E11.65, Disp: 400 each, Rfl: 3    Cholecalciferol (D3 HIGH POTENCY) 50 MCG (2000 UT) CAPS, Take 1 capsule by mouth daily, Disp: 90 capsule, Rfl: 3    CALCIUM PO, Take by mouth, Disp: , Rfl:     cyanocobalamin 1000 MCG tablet, Take 1,000 mcg by mouth, Disp: , Rfl:     fluticasone (FLONASE) 50 MCG/ACT nasal spray, 2 sprays by Nasal route daily, Disp: , Rfl:     Sod Fluoride-Potassium Nitrate 1.1-5 % PSTE, BRUSH TEETH FOR 2 MINUTES TWICE A DAY. EXPECTORATE. DO NOT EAT OR DINK AFTER BRUSHING, Disp: , Rfl:       Family history:    Family History   Problem Relation Age of Onset    Diabetes Paternal Grandfather     Diabetes Father     Heart Disease Paternal Grandfather     Hypertension Paternal Grandmother     Heart Disease Paternal Grandmother     Hypertension Maternal Grandfather     Heart Disease Maternal Grandfather     Hypertension Maternal Grandmother     Heart Disease Maternal Grandmother     Stroke Mother     Hypertension Mother     Diabetes Mother     Heart Disease Father     Hypertension Father     Hypertension Paternal Grandfather         Past medical history:    Past Medical History:   Diagnosis Date    Anasarca     Congestive heart failure ([facility]     sees Dr Tuan Williamson    Hypertension     Lazy eye of right side     had since infancy    Mixed hyperlipidemia     Morbid obesity ([facility]     NAFLD (nonalcoholic fatty liver disease)     MARK (obstructive sleep apnea)     Type 2 diabetes mellitus ([facility]           Physical exam:    /62 (Site: Right Upper Arm, Position: Sitting)   Pulse 95   Resp 20   Ht 5' 6\" (1.676 m)   Wt (!) 393 lb 3.2 oz (178.4 kg)   SpO2 98%   BMI 63.46 kg/m²     Physical Exam  Vitals reviewed. Constitutional:       Appearance: Normal appearance. He is obese.    HENT: Head: Normocephalic and atraumatic. Cardiovascular:      Rate and Rhythm: Normal rate and regular rhythm. Pulmonary:      Effort: Pulmonary effort is normal.      Breath sounds: Normal breath sounds. Neurological:      General: No focal deficit present. Mental Status: He is alert and oriented to person, place, and time. Psychiatric:         Mood and Affect: Mood normal.    Diabetic foot exam:   Left Foot:   Visual Exam: normal   Pulse DP: 2+ (normal)   Filament test: normal sensation     Right Foot:   Visual Exam: normal   Pulse DP: 2+ (normal)   Filament test: normal sensation           Recent labs:    Lab Results   Component Value Date    CHOL 190 03/08/2022    CHOL 256 (H) 12/07/2021     Lab Results   Component Value Date    TRIG 213 (H) 03/08/2022    TRIG 731 (HH) 12/07/2021     Lab Results   Component Value Date    HDL 40 03/08/2022    HDL 30 (L) 12/07/2021     Lab Results   Component Value Date    LDLCALC 113 (H) 03/08/2022    LDLCALC 100 (H) 12/07/2021     Lab Results   Component Value Date    VLDL 37 03/08/2022    VLDL 126 (H) 12/07/2021     No results found for: [facility]  Lab Results   Component Value Date     (L) 06/01/2022    K 3.9 06/01/2022    CL 99 06/01/2022    CO2 30 06/01/2022    BUN 15 06/01/2022    CREATININE 1.00 06/01/2022    GLUCOSE 254 (H) 06/01/2022    CALCIUM 9.3 06/01/2022    PROT 7.8 06/01/2022    LABALBU 3.9 06/01/2022    BILITOT 0.6 06/01/2022    ALKPHOS 101 06/01/2022    AST 17 06/01/2022    ALT 38 06/01/2022    LABGLOM >60 06/01/2022    GFRAA >60 06/01/2022    AGRATIO 1.2 03/08/2022    GLOB 3.9 (H) 06/01/2022     Lab Results   Component Value Date    WBC 6.3 06/01/2022    HGB 16.6 06/01/2022    HCT 48.1 06/01/2022    MCV 81.8 06/01/2022     06/01/2022             This document was generated with the aid of voice recognition software. . Please be aware that there may be inadvertent transcription errors not identified and corrected by the [employer]

## 2022-08-29 NOTE — ASSESSMENT & PLAN NOTE
Lab Results   Component Value Date    LABA1C 8.4 (H) 03/08/2022     Lab Results   Component Value Date     03/08/2022     Key Antihyperglycemic Medications          JARDIANCE 25 MG tablet (Taking)    Sig - Route:  Take 1 tablet by mouth in the morning. - Oral    Prior authorization: Closed - Prior Authorization not required for patient/medication    NOVOLOG FLEXPEN 100 UNIT/ML injection pen (Taking)    Sig: Correction scale 5/50>150 (up to 30 units daily)    TRESIBA FLEXTOUCH 200 UNIT/ML SOPN (Taking)    Sig: Start 116 units daily, increase by 4 units every 4 days until FBG , max daily 150    OZEMPIC, 0.25 OR 0.5 MG/DOSE, 2 MG/1.5ML SOPN (Taking)    Sig - Route: Inject 0.5 mg into the skin once a week - SubCUTAneous        Suboptimal control, patient will continue with current medications, but has been suggested to reach out to endocrine to discuss increasing  Ozempic dose, as he has tolerated current dose, we discussed about modifications in his diet, and has been encouraged to continue physical activity

## 2022-08-30 LAB — FRUCTOSAMINE SERPL-SCNC: 399 UMOL/L (ref 0–285)

## 2022-09-01 ENCOUNTER — TELEPHONE (OUTPATIENT)
Dept: INTERNAL MEDICINE CLINIC | Facility: CLINIC | Age: 32
End: 2022-09-01

## 2022-09-01 DIAGNOSIS — E11.65 UNCONTROLLED TYPE 2 DIABETES MELLITUS WITH HYPERGLYCEMIA (HCC): ICD-10-CM

## 2022-09-01 DIAGNOSIS — E11.9 DIABETES TYPE 2, NO OCULAR INVOLVEMENT (HCC): Primary | ICD-10-CM

## 2022-09-01 RX ORDER — SEMAGLUTIDE 1.34 MG/ML
1 INJECTION, SOLUTION SUBCUTANEOUS
COMMUNITY
End: 2022-09-02 | Stop reason: SDUPTHER

## 2022-09-01 NOTE — TELEPHONE ENCOUNTER
----- Message from Elizabeth Lane MD sent at 9/1/2022  7:48 AM EDT -----  John Monae, can you please tell  Dari Ruano to increase his Ozempic to 1 mg , until he sees endocrine  ----- Message -----  From: Brendan Bruce PA-C  Sent: 8/31/2022   6:06 PM EDT  To: Elizabeth Lane MD    Absolutely, I think the higher dose might be helpful. Also, the 1mg dose is easier to obtain right now. I'll see him on 9/27 but have no qualms if you send the 1mg or if he doubles his injection of the 0.5mg dose for the next 3 weeks until he comes i  n for his visit. Let me know if you need me to RX the change.  Respectfully, Mary Jim

## 2022-09-01 NOTE — TELEPHONE ENCOUNTER
Called pt to increase his Ozempic but his pen only goes to 0.5. He will use the one he has an take 2 injections of the 0.5 but needs new pens sent in for a higher dose.     Requested Prescriptions     Pending Prescriptions Disp Refills    Semaglutide, 1 MG/DOSE, (OZEMPIC, 1 MG/DOSE,) 4 MG/3ML SOPN 3 mL 2     Sig: Inject 1 mg into the skin once a week

## 2022-09-02 RX ORDER — SEMAGLUTIDE 1.34 MG/ML
1 INJECTION, SOLUTION SUBCUTANEOUS WEEKLY
Qty: 3 ML | Refills: 2 | Status: SHIPPED | OUTPATIENT
Start: 2022-09-02 | End: 2022-09-27

## 2022-09-27 ENCOUNTER — TELEMEDICINE (OUTPATIENT)
Dept: ENDOCRINOLOGY | Age: 32
End: 2022-09-27
Payer: COMMERCIAL

## 2022-09-27 DIAGNOSIS — E11.65 UNCONTROLLED TYPE 2 DIABETES MELLITUS WITH HYPERGLYCEMIA (HCC): Primary | ICD-10-CM

## 2022-09-27 DIAGNOSIS — E78.2 MIXED HYPERLIPIDEMIA: ICD-10-CM

## 2022-09-27 DIAGNOSIS — E05.90 SUBCLINICAL HYPERTHYROIDISM: ICD-10-CM

## 2022-09-27 DIAGNOSIS — E66.01 MORBID OBESITY WITH BMI OF 60.0-69.9, ADULT (HCC): ICD-10-CM

## 2022-09-27 PROCEDURE — 3046F HEMOGLOBIN A1C LEVEL >9.0%: CPT | Performed by: PHYSICIAN ASSISTANT

## 2022-09-27 PROCEDURE — 99214 OFFICE O/P EST MOD 30 MIN: CPT | Performed by: PHYSICIAN ASSISTANT

## 2022-09-27 RX ORDER — LANCETS 30 GAUGE
EACH MISCELLANEOUS
Qty: 400 EACH | Refills: 3 | Status: SHIPPED | OUTPATIENT
Start: 2022-09-27

## 2022-09-27 RX ORDER — BLOOD-GLUCOSE METER
EACH MISCELLANEOUS
Qty: 1 KIT | Refills: 0 | Status: SHIPPED | OUTPATIENT
Start: 2022-09-27

## 2022-09-27 RX ORDER — LANCETS 33 GAUGE
EACH MISCELLANEOUS
COMMUNITY
Start: 2022-09-05

## 2022-09-27 RX ORDER — SEMAGLUTIDE 2.68 MG/ML
2 INJECTION, SOLUTION SUBCUTANEOUS
Qty: 9 ML | Refills: 3 | Status: SHIPPED | OUTPATIENT
Start: 2022-09-27 | End: 2022-11-04 | Stop reason: SDUPTHER

## 2022-09-27 RX ORDER — SEMAGLUTIDE 1.34 MG/ML
1 INJECTION, SOLUTION SUBCUTANEOUS
COMMUNITY
End: 2022-09-27 | Stop reason: DRUGHIGH

## 2022-09-27 RX ORDER — BLOOD SUGAR DIAGNOSTIC
STRIP MISCELLANEOUS
Qty: 400 EACH | Refills: 3 | Status: SHIPPED | OUTPATIENT
Start: 2022-09-27

## 2022-09-27 NOTE — PROGRESS NOTES
complications associated with uncontrolled diabetes. - ONETOUCH VERIO strip; Use to check blood glucose four times daily E11.65  Dispense: 400 each; Refill: 3  - Blood Glucose Monitoring Suppl (ONETOUCH VERIO) w/Device KIT; Use to check blood glucose four times daily E11.65  Dispense: 1 kit; Refill: 0  - Lancets (ONETOUCH DELICA PLUS [id number]) MISC; Use to check blood glucose four times daily E11.65  Dispense: 400 each; Refill: 3  - OZEMPIC, 2 MG/DOSE, 8 MG/3ML SOPN; Inject 2 mg into the skin every 7 days  Dispense: 9 mL; Refill: 3  - Comprehensive Metabolic Panel; Future  - CBC with Auto Differential; Future  - Microalbumin / Creatinine Urine Ratio; Future  - Lipid Panel; Future  - Hemoglobin A1C; Future  - TSH with Reflex; Future    2. Subclinical hyperthyroidism    Historically euthyroid on 2.5 mg of methimazole which he will continue    3. Morbid obesity with BMI of 60.0-69.9, adult [facility])  Patient would certainly benefit from improved glycemic control with agents that are either weight neutral or confer weight loss as a side effect. Diet and exercise discussed    4. Mixed hyperlipidemia  Above goal March 2022, prescribed rosuvastatin 40 mg. Other metabolic conditions may be contributory. Diet and exercise discussed. Recheck fasting labs prior to next Reji Lennon was seen today for diabetes. Diagnoses and all orders for this visit:    Uncontrolled type 2 diabetes mellitus with hyperglycemia ([facility] [address]  -     ONETOUCH VERIO strip; Use to check blood glucose four times daily E11.65  -     Blood Glucose Monitoring Suppl (ONETOUCH VERIO) w/Device KIT; Use to check blood glucose four times daily E11.65  -     Lancets (ONETOUCH DELICA PLUS [id number]) MISC; Use to check blood glucose four times daily E11.65  -     OZEMPIC, 2 MG/DOSE, 8 MG/3ML SOPN; Inject 2 mg into the skin every 7 days  -     Comprehensive Metabolic Panel;  Future  -     CBC with Auto Differential; Future  -     Microalbumin / Creatinine Urine Ratio; Future  -     Lipid Panel; Future  -     Hemoglobin A1C; Future  -     TSH with Reflex; Future    Subclinical hyperthyroidism    Morbid obesity with BMI of 60.0-69.9, adult ([address]    Mixed hyperlipidemia          History of Present Illness:    9/27/2022  VIRTUAL VISIT  Dipak Sloan is a 28 y.o. male who was seen by synchronous (real-time) audio-video technology on 9/27/2022 . The patient provided consent for this audio-video interaction. The [employer] platform was used. Patient was located at their home and provider in the office. Patient meets for virtual follow-up. He is very rarely checking his blood sugar and reports that it is always in the 200s, lower since he has been cutting out diet and regular beverages. He continues to have a sedentary lifestyle and multiple dietary indiscretions that are contributory  Walking at times, eating out less, may be facing insurance adjustment. Current Regimen: Tresiba 152 (uptitrated since re-starting Dec 2021), Novolog by correction 5/50>150,  Jardiance 25mg, [id number]        6/1/2022  Deviously controlled type 2 diabetes status post bariatric gastric sleeve surgery, after gastric sleeve multiple medications were discontinued, previously SGLT2 I was restarted due to history of CHF with low EF, basal insulin was restarted but uptitrated to 116 units. He is taking NovoLog by correction however is only monitoring his blood sugar once or twice per day, often fasting limiting efficacy and use. He was started on Ozempic by another provider 3 weeks ago and has taken 3 doses of the 0.25 titration dose with good tolerance of medication.   He walks his dogs around his house a few times per day but admits that portion size remains a major barrier to care and is often eating moderately healthy foods in large quantities         Current Regimen: Tresiba 116 (uptitrated from 40 units since re-starting Dec 2021), Novolog by correction 5/50>150,  Jardiance 25mg, ozempic 0.25mg         1/28/2022   Returns to clinic for late follow-up, has not been seen in greater than 1 year. Is status post gastric sleeve. After gastric sleeve surgery medications were discontinued. At previous visit SGLT2 I was restarted due to history of CHF with low EF. Patient  was only able to obtain [facility] and PA was started to ensure he was able to get most appropriate agent Jardiance. Review of records shows drastically worsening A1c since last visit      Admits to some compliance problems      Was RX'd ozempic in Dec 2021 was unable to obtain        DIABETES           Date of Diagnosis: Type 2 diabetes mellitus diagnosed 6/2017.       09/25/2019   Patient returns clinic for follow-up visit status post gastric bypass on Jardiance and NovoLog by correction. Patient is increasing his activity, attempting to follow a strict diet, and is losing weight. Patient has stable glycemic control without  side effects of medication and without hypoglycemia. Patient is tolerating current treatment regimen with hemoglobin A1c at goal.       Additionally, patient was found to have suppressed TSH with negative TSI and unremarkable thyroid ultrasound. He is on 2.5 mg of methimazole with improvement of his thyroid function tests, no change in condition       05/14/2020   Shadia Correa is a 30 y.o. male who was seen by synchronous (real-time) audio-video technology on 05/14/2020 . The patient provided consent for this audio-video interaction. The [employer] platform was used. Pt meets for virtual follow up. Doing well but limited exercise and some poor diet choices. Pt reports that PCP changed from Jose turner to steglatro due to cost and access       9/4/2020   VIRTUAL VISIT   Shadia Correa is a 30 y.o. male who was seen by synchronous (real-time) audio-video technology on 9/4/2020 . The patient provided consent for this audio-video interaction. The [employer] platform was used.  Patient and provider were located  at their individual homes. Pt on steglatro instead of jardiance due to cost, notes worsening of control since changing to [employer]. Is attempting to eat more healthy, less canned food, trying walk but suffers SOB,          Diet: Now on nector shakes,    3-5 small meal. He tries to avoid sweets for the most part. No sweet tea or regular soft drinks. He is trying to limit carbohydrates. He eats >3000 calories per day (he previously ate >10,000 calories per day). Exercise: walking, 3 days per week, and chair exercises       Monitoring: One Touch Ultra Mini            Home blood glucose monitoring frequency: 1-2 times per day    By recall     Typical Standard Deviation   Fasting ~200    AC lunch     AC supper ~200    Bedtime       Blood glucose levels are uncontrolled, by review of A1c     Hypoglycemia: +Hypoglycemic awareness. No recent episodes. Hemoglobin A1c:   8/14/2017: 11.9.   11/14/2017: 5.8.   3/7/2018: 5.8.   7/16/2018: 6.2.   10/15/2018: 5.1   03/07/2019: 5.1%   06/07/2019: 5.7%   09/25/2019: 5.8%      12/07/2021: 12.0%  03/08/2022: 8.4%  08/29/2022: 9.1%               Microalbumin/Nephropathy:   8/14/2017: Creatinine 0.89 (), urine microalbumin/creatinine 14.8.   10/15/2018      Cr 1.15, GFR 87, microalbumin/Cr ratio 5.7   12/06/2018      Cr 1.30, GFR >60   06/07/2019      Cr 1.05, GFR 97   12/07/2021     Cr 0.81,   03/08/2022          Cr 0.90, , microalbumin/Cr ratio 15  08/29/2022 Cr 1.10, GFR >60            Neuropathy: Denies burning, numbness, tingling of feet. Retinopathy: Eye exam Early 2022 - no retinopathy. Lipids: He is tolerating rosuvastatin and fenofibrate. 8/14/2017: Total cholesterol 143, triglycerides 380, HDL 34, LDL 33, VLDL 76, AST 60, .   10/05/2018  TC- 132, LDL- 59, VLDL- 38,  HDL- 35, TG- 188    03/08/2022  TC- 190, LDL- 113, VLDL- 37,  HDL- 40, TG- 213          TSH:   4/11/2016: TSH 0.808. 2/21/2018: TSH 0.768, free T4 1.20.   10/15/2018: TSH 0.362, free  T4 1.44   11/02/2018: TSH 0.280, free  T4 1.36   03/07/2019: TSH 0.402, free T4 1.19, total T3 107    06/07/2019: TSH 0.811, Free T4 133, total T3 141 (AST 18, ALT 24) ( on 2.5mg methimazole)  03/08/2022: TSH 0.722, free T4 1.23                          Thyroid Antibodies               TPO     14 (negative)                           11/02/2018                     TSI       <0.10 (negative)                      12/06/2018               Thyroglobulin ab <1.0 (negative)        11/02/2018 11/12/2018   Thyroid ultrasound. CLINICAL INDICATION: Abnormal thyroid function tests       PROCEDURE: Real-time grayscale sonographic evaluation of the thyroid gland with   color Doppler interrogation. COMPARISON: No prior       FINDINGS:       RIGHT THYROID LOBE: The right thyroid lobe measures 6.2 x 2.9 x 2.3 cm. The   echotexture is homogeneous. No nodules identified. LEFT THYROID LOBE:  The left thyroid lobe measures 6.2 x 2.6 x 2.5 cm. The   echotexture is homogeneous. No nodules identified. The isthmus measures 5 mm        IMPRESSION   IMPRESSION: Unremarkable thyroid ultrasound. Prior Treatment: He previously took Normie Moh 170 units at bedtime, Novolog 18 units with meals, metformin 1000 mg twice a day. He has tolerated  both victoza and jardiance       He was hospitalized at [address] [place] [address] secondary to congestive heart failure. He weighed 680 pounds at the time of his admission. He has been evaluated by bariatric surgery (Dr. Namita Nunez) and gastric  bypass has been recommended, gastric sleeve performed 12/2018. Allergies & Medications:  Reviewed in chart. Review of Systems    Vital Signs: There were no vitals taken for this visit. Return late jan- early feb 2023, for Diabetes DM2 Follow-Up. Portions of this note were generated with the assistance of voice recogniton software.   As such, some errors in transcription may be present.

## 2022-11-04 DIAGNOSIS — E11.65 UNCONTROLLED TYPE 2 DIABETES MELLITUS WITH HYPERGLYCEMIA (HCC): ICD-10-CM

## 2022-11-04 DIAGNOSIS — I10 ESSENTIAL HYPERTENSION: ICD-10-CM

## 2022-11-04 RX ORDER — SPIRONOLACTONE 25 MG/1
25 TABLET ORAL DAILY
Qty: 90 TABLET | Refills: 3 | Status: SHIPPED | OUTPATIENT
Start: 2022-11-04 | End: 2022-11-11 | Stop reason: SDUPTHER

## 2022-11-04 RX ORDER — LISINOPRIL 40 MG/1
20 TABLET ORAL DAILY
Qty: 90 TABLET | Refills: 3 | Status: SHIPPED | OUTPATIENT
Start: 2022-11-04 | End: 2022-11-11 | Stop reason: SDUPTHER

## 2022-11-04 RX ORDER — CARVEDILOL 25 MG/1
25 TABLET ORAL 2 TIMES DAILY
Qty: 180 TABLET | Refills: 3 | Status: SHIPPED | OUTPATIENT
Start: 2022-11-04 | End: 2022-11-11 | Stop reason: SDUPTHER

## 2022-11-04 RX ORDER — SEMAGLUTIDE 2.68 MG/ML
2 INJECTION, SOLUTION SUBCUTANEOUS
Qty: 9 ML | Refills: 3 | Status: SHIPPED | OUTPATIENT
Start: 2022-11-04 | End: 2022-11-18 | Stop reason: SDUPTHER

## 2022-11-04 NOTE — TELEPHONE ENCOUNTER
MEDICATION REFILL REQUEST      Name of Medication:  coreg   Dose:  25   Frequency:  twice a day  Quantity:  30  Days' supply:  1 month         Name of Medication:  spironolactone  Dose:  25mg  Frequency:  once a day  Quantity:  30  Days' supply:  1 month      Pharmacy Name/Location:  [facility] [address] [phone]      Please call when it is sent in.

## 2022-11-08 ENCOUNTER — TELEPHONE (OUTPATIENT)
Dept: ENDOCRINOLOGY | Age: 32
End: 2022-11-08

## 2022-11-08 ENCOUNTER — TELEPHONE (OUTPATIENT)
Dept: CARDIOLOGY CLINIC | Age: 32
End: 2022-11-08

## 2022-11-08 NOTE — TELEPHONE ENCOUNTER
OUT of Corlanor and the pharmacy said he needs a prior auth and he has no more.    [facility] [address]

## 2022-11-09 ENCOUNTER — TELEPHONE (OUTPATIENT)
Dept: CARDIOLOGY CLINIC | Age: 32
End: 2022-11-09

## 2022-11-09 NOTE — TELEPHONE ENCOUNTER
Called pharmacy and they stated that he has two insurances. His BCBS approved it but it is $514. He still needed a pa [employer]ander [employer]. PA submitted via CMM.

## 2022-11-10 NOTE — TELEPHONE ENCOUNTER
Received letter from [facility] and they stated that pt has other insurance coverage and that they are the payor of last resort. If pt feels this in error he will need to contact them at member services. I called provider services and they stated that if they knew what the deductible was they could get an override. They called the pharmacy and they stated that they would need to contact the pt. They then tried to contact the pt and his mother stated that he did not have other insurance. But the pharmacy is stating that he does. He will need to contact the pharmacy and have them take the other insurance off if he does not have it. I called him and informed him of this.

## 2022-11-11 ENCOUNTER — TELEPHONE (OUTPATIENT)
Dept: CARDIOLOGY CLINIC | Age: 32
End: 2022-11-11

## 2022-11-11 DIAGNOSIS — E78.2 MIXED HYPERLIPIDEMIA: ICD-10-CM

## 2022-11-11 DIAGNOSIS — I10 ESSENTIAL HYPERTENSION: ICD-10-CM

## 2022-11-11 DIAGNOSIS — E11.65 UNCONTROLLED TYPE 2 DIABETES MELLITUS WITH HYPERGLYCEMIA (HCC): ICD-10-CM

## 2022-11-11 RX ORDER — LISINOPRIL 40 MG/1
20 TABLET ORAL DAILY
Qty: 90 TABLET | Refills: 3 | Status: SHIPPED | OUTPATIENT
Start: 2022-11-11

## 2022-11-11 RX ORDER — ROSUVASTATIN CALCIUM 40 MG/1
40 TABLET, COATED ORAL EVERY EVENING
Qty: 90 TABLET | Refills: 3 | Status: SHIPPED | OUTPATIENT
Start: 2022-11-11

## 2022-11-11 RX ORDER — FUROSEMIDE 20 MG/1
20 TABLET ORAL DAILY
Qty: 90 TABLET | Refills: 3 | Status: SHIPPED | OUTPATIENT
Start: 2022-11-11

## 2022-11-11 RX ORDER — EMPAGLIFLOZIN 25 MG/1
25 TABLET, FILM COATED ORAL DAILY
Qty: 90 TABLET | Refills: 3 | Status: SHIPPED | OUTPATIENT
Start: 2022-11-11 | End: 2022-11-18 | Stop reason: SDUPTHER

## 2022-11-11 RX ORDER — FENOFIBRATE 145 MG/1
145 TABLET, COATED ORAL DAILY
Qty: 90 TABLET | Refills: 3 | Status: SHIPPED | OUTPATIENT
Start: 2022-11-11

## 2022-11-11 RX ORDER — CARVEDILOL 25 MG/1
25 TABLET ORAL 2 TIMES DAILY
Qty: 180 TABLET | Refills: 3 | Status: SHIPPED | OUTPATIENT
Start: 2022-11-11

## 2022-11-11 RX ORDER — ICOSAPENT ETHYL 1000 MG/1
2 CAPSULE ORAL 2 TIMES DAILY WITH MEALS
Qty: 180 CAPSULE | Refills: 3 | Status: SHIPPED | OUTPATIENT
Start: 2022-11-11

## 2022-11-11 RX ORDER — SPIRONOLACTONE 25 MG/1
25 TABLET ORAL DAILY
Qty: 90 TABLET | Refills: 3 | Status: SHIPPED | OUTPATIENT
Start: 2022-11-11

## 2022-11-11 NOTE — TELEPHONE ENCOUNTER
Winifred Puri pa initiated via covermymeds for Medicaid. Novolog pa initiated via covermymeds for Medicaid.

## 2022-11-11 NOTE — TELEPHONE ENCOUNTER
Pt needs prior auths for all of his prescriptions, written by Dr. Gerald Hardwick, to be sent to [employer].

## 2022-11-15 ENCOUNTER — TELEPHONE (OUTPATIENT)
Dept: CARDIOLOGY CLINIC | Age: 32
End: 2022-11-15

## 2022-11-15 NOTE — TELEPHONE ENCOUNTER
Patient needs a prior authorization for corlanor and also jardiance. He now has [employer] medicaid not [employer] blue [employer] insurance.

## 2022-11-16 ENCOUNTER — TELEPHONE (OUTPATIENT)
Dept: CARDIOLOGY CLINIC | Age: 32
End: 2022-11-16

## 2022-11-16 NOTE — TELEPHONE ENCOUNTER
Pharmacy calling saying they need a new RX for Corlanor 5 mg  and they are also needing a PA,pt has new [employer]  [phone]  ID [id number]  Please get pa for Corlanor and call in a new rx to [facility]

## 2022-11-17 NOTE — TELEPHONE ENCOUNTER
Called patient's pharmacy and they stated that his BCBS is still active. Will call patient to let him know.

## 2022-11-17 NOTE — TELEPHONE ENCOUNTER
Patient notified that his [address] is still active and that his medications are coming back very expensive. Medicaid will not cover any of his medications since his BCBS is still active. Patient informed to call [facility] and discontinue his insurance with them in order for me to do the prior authorizations through [place]. Patient will call back once his BCBS is no longer active.

## 2022-11-17 NOTE — TELEPHONE ENCOUNTER
Patient's prior authorizations were unable to be processed due to it stating that he has additional coverage outside of his Medicaid and that Medicaid is the last resort. Need to call pharmacy to see what insurance coverage he has.

## 2022-11-18 DIAGNOSIS — E11.65 UNCONTROLLED TYPE 2 DIABETES MELLITUS WITH HYPERGLYCEMIA (HCC): Primary | ICD-10-CM

## 2022-11-18 RX ORDER — INSULIN LISPRO 100 U/ML
INJECTION, SOLUTION SUBCUTANEOUS
Qty: 15 ML | Refills: 11 | Status: SHIPPED | OUTPATIENT
Start: 2022-11-18

## 2022-11-18 RX ORDER — IVABRADINE 5 MG/1
TABLET, FILM COATED ORAL
Qty: 60 TABLET | Refills: 0 | OUTPATIENT
Start: 2022-11-18

## 2022-11-18 RX ORDER — EMPAGLIFLOZIN 25 MG/1
25 TABLET, FILM COATED ORAL DAILY
Qty: 30 TABLET | Refills: 11 | Status: SHIPPED | OUTPATIENT
Start: 2022-11-18

## 2022-11-18 RX ORDER — SEMAGLUTIDE 2.68 MG/ML
2 INJECTION, SOLUTION SUBCUTANEOUS
Qty: 3 ML | Refills: 11 | Status: SHIPPED | OUTPATIENT
Start: 2022-11-18

## 2022-11-18 RX ORDER — INSULIN GLARGINE-YFGN 100 [IU]/ML
75 INJECTION, SOLUTION SUBCUTANEOUS 2 TIMES DAILY
Qty: 45 ML | Refills: 11 | Status: SHIPPED | OUTPATIENT
Start: 2022-11-18

## 2022-11-18 NOTE — TELEPHONE ENCOUNTER
Patient undergoing insurance changed from [facility] to [facility] Medicaid. Multiple medications not on formulary including concentrated [place] and NovoLog. Spoke to patient who is currently taking 152 units of [place] daily. We will change this to 1 ISG and however given you 100 concentration you will need to do 75 units twice daily. Aware not to inject more than 80 units into a single injection site. Admelog substituted for NovoLog.     States he is completely out of his ivabradine 5mg from cardiology (it is 5pm on Friday) so I sent a 30 day supply but he is aware to reach out to cardiology if this requires PA or if there are any issues with formulary

## 2022-11-18 NOTE — TELEPHONE ENCOUNTER
Pt LVM stating he no longer has BCBS and now has [employer] medicaid. Novolog and [place] are not covered. Formulary alternatives are Adnelog and insulin glargine yfgn. Pt ok with switching if ok with [place] . Not sure what to put in for Tresiba sig, per last VV he uptitrated to 116U QD. Novolog did not specify if to use correction TIDAC or when, medicaid will probably want to know when he is to use correction.

## 2022-11-23 NOTE — TELEPHONE ENCOUNTER
Received denial from medicaid. It requires that we mail in the appeal or we can call and do an oral appeal. I have tried to call twice to do oral appeal since that would be the fastest. Will continue to call.

## 2022-12-01 NOTE — TELEPHONE ENCOUNTER
Finally was able to get through to [address] and do a peer to peer and they stated that it is approved. I called pt and informed him of this. Told him to give it 24hrs then see if he can get it filled. Informed him that if he has any further issues to please let me know.

## 2022-12-06 ENCOUNTER — OFFICE VISIT (OUTPATIENT)
Dept: CARDIOLOGY CLINIC | Age: 32
End: 2022-12-06
Payer: COMMERCIAL

## 2022-12-06 VITALS
HEART RATE: 92 BPM | BODY MASS INDEX: 50.62 KG/M2 | SYSTOLIC BLOOD PRESSURE: 110 MMHG | WEIGHT: 315 LBS | DIASTOLIC BLOOD PRESSURE: 62 MMHG | HEIGHT: 66 IN

## 2022-12-06 DIAGNOSIS — Z98.84 BARIATRIC SURGERY STATUS: ICD-10-CM

## 2022-12-06 DIAGNOSIS — E11.9 TYPE 2 DIABETES MELLITUS WITHOUT COMPLICATION, WITHOUT LONG-TERM CURRENT USE OF INSULIN (HCC): ICD-10-CM

## 2022-12-06 DIAGNOSIS — I10 ESSENTIAL HYPERTENSION: Primary | ICD-10-CM

## 2022-12-06 DIAGNOSIS — I50.22 CHRONIC SYSTOLIC (CONGESTIVE) HEART FAILURE (HCC): ICD-10-CM

## 2022-12-06 DIAGNOSIS — E66.01 MORBID (SEVERE) OBESITY DUE TO EXCESS CALORIES (HCC): ICD-10-CM

## 2022-12-06 PROCEDURE — 3074F SYST BP LT 130 MM HG: CPT | Performed by: INTERNAL MEDICINE

## 2022-12-06 PROCEDURE — 3046F HEMOGLOBIN A1C LEVEL >9.0%: CPT | Performed by: INTERNAL MEDICINE

## 2022-12-06 PROCEDURE — 3078F DIAST BP <80 MM HG: CPT | Performed by: INTERNAL MEDICINE

## 2022-12-06 PROCEDURE — 99215 OFFICE O/P EST HI 40 MIN: CPT | Performed by: INTERNAL MEDICINE

## 2022-12-06 PROCEDURE — 93000 ELECTROCARDIOGRAM COMPLETE: CPT | Performed by: INTERNAL MEDICINE

## 2022-12-06 NOTE — PROGRESS NOTES
[address], [place]  [address], [facility], [address] [place] [address]  PHONE: [phone]    SUBJECTIVE: Abiola Findkenrick Messer (1990) is a 28 y.o. male seen for a follow up visit regarding the following:        ICD-10-CM ICD-9-CM   1. Chronic systolic congestive heart failure (HCC)  I50.22 428.22     428.0   2. Type 2 diabetes mellitus without complication, unspecified whether long term insulin use (HCC)  E11.9 250.00   3. Gastric bypass status for obesity  Z98.84 V45.86   4. Essential hypertension  I10 401.9     Patient is doing well. No new symptoms. Patient is taking all current medications. Patient denies any chest pain, shortness, of breath, edema, or palpitations. Sky Graham is not getting approved by insurance. Have not tried [place]. Last echo 2020: EF 61-03 %; LV systolic dysfunction moderately reduced, improved since 2018. Overall patient appears to be doing well he is on guideline directed medical therapy. Is been a couple years since her last echo. We will get 1 for the next follow-up    We will add Aldactone 25 mg p.o. daily in addition to the rest of his guideline directed medical therapy    Here for follow-up of echocardiogram performed recently  Recent echo shows ejection fraction 40 to 45%. This is minimal interval improvement from his last echo      Past Medical History, Past Surgical History, Family history, Social History, and Medications were all reviewed with the patient today and updated as necessary.       Allergies   Allergen Reactions    Sting, Bee Anaphylaxis    Barium Iodide Other (comments)     Mother's reaction    Bee Pollen Other (comments)     Past Medical History:   Diagnosis Date    Anasarca     Congestive heart failure ([address]     sees Dr Juliane Schultz    Hypertension     Lazy eye of right side     had since infancy    Mixed hyperlipidemia     Morbid obesity ([address]     NAFLD (nonalcoholic fatty liver disease)     MARK (obstructive sleep apnea)     Type 2 diabetes mellitus ([facility]      Past Surgical History:   Procedure Laterality Date    HX LAP GASTRIC BYPASS  12/26/2018    gastric     HX TONSILLECTOMY       Family History   Problem Relation Age of Onset    Stroke Mother     Hypertension Mother     Diabetes Mother     Heart Disease Father     Hypertension Father     Diabetes Father     Heart Disease Maternal Grandmother     Hypertension Maternal Grandmother     Heart Disease Maternal Grandfather     Hypertension Maternal Grandfather     Heart Disease Paternal Grandmother     Hypertension Paternal Grandmother     Heart Disease Paternal Grandfather     Hypertension Paternal Grandfather     Diabetes Paternal Grandfather       Social History     Tobacco Use    Smoking status: Never Smoker    Smokeless tobacco: Never Used   Substance Use Topics    Alcohol use: No     [unfilled]    Current Outpatient Medications:     sodium fluoride-pot nitrate 1.1-5 % pste, BRUSH TEETH FOR 2 MINUTES TWICE A DAY. EXPECTORATE. DO NOT EAT OR DINK AFTER BRUSHING, Disp: , Rfl:     Jardiance 25 mg tablet, Take 1 Tablet by mouth daily. , Disp: 90 Tablet, Rfl: 3    Ozempic 0.25 mg or 0.5 mg/dose (2 mg/1.5 ml) subq pen, 0.5 mg by SubCUTAneous route every seven (7) days. Start 0.25mg weekly for four weeks before increasing to 0.5mg weekly, Disp: 3 Box, Rfl: 3    NovoLOG Flexpen U-100 Insulin 100 unit/mL (3 mL) inpn, Correction scale 5/50>150 (up to 30 units daily), Disp: 5 Pen, Rfl: 11    Matilde Pen Needle 32 gauge x 5/32\" ndle, Use with insulin up to 4 times daily, E11.65, Disp: 400 Pen Needle, Rfl: 3    OneTouch Delica Lancets 30 gauge misc, 3 times a day (E11.65), Disp: 100 Lancet, Rfl: 11    One Touch Delica kit, 3 times a day (E11.65), Disp: 1 Kit, Rfl: 1    methIMAzole (TAPAZOLE) 5 mg tablet, Take 0.5 Tablets by mouth daily. , Disp: 45 Tablet, Rfl: 3    rosuvastatin (CRESTOR) 40 mg tablet, Take 1 Tablet by mouth nightly., Disp: 90 Tablet, Rfl: 1    carvediloL (COREG) 25 mg tablet, TAKE 1 TABLET BY MOUTH TWICE DAILY WITH MEALS, Disp: 60 Tablet, Rfl: 5    fenofibrate nanocrystallized (TRICOR) 145 mg tablet, Take 1 Tablet by mouth daily. , Disp: 30 Tablet, Rfl: 5    furosemide (LASIX) 20 mg tablet, Take 1 tablet by mouth once daily, Disp: 30 Tablet, Rfl: 5    insulin degludec Zain [employer]s FlexTouch U-200) 200 unit/mL (3 mL) inpn pen, 40 units daily (Patient taking differently: 63 Units. 40 units daily), Disp: 5 Adjustable Dose Pre-filled Pen Syringe, Rfl: 11    ivabradine (CORLANOR) 5 mg tablet, Take 1 Tablet by mouth two (2) times daily (with meals). , Disp: 180 Tablet, Rfl: 3    lisinopriL (PRINIVIL, ZESTRIL) 40 mg tablet, Take 0.5 Tablets by mouth daily. , Disp: 30 Tablet, Rfl: 11    glucose blood VI test strips (Accu-Chek Guide test strips) strip, Test Blood Sugar TID, Disp: 300 Strip, Rfl: 5    glucose blood VI test strips (Accu-Chek Cindy Plus test strp) strip, Test blood sugar tid, Disp: 200 Strip, Rfl: 5    CALCIUM PO, Take  by mouth., Disp: , Rfl:     fluticasone propionate (FLONASE) 50 mcg/actuation nasal spray, 2 Sprays by Both Nostrils route daily. , Disp: 1 Bottle, Rfl: 5    Blood-Glucose Meter (ACCU-CHEK CINDY PLUS METER) [facility], One unit, Disp: 1 Each, Rfl: 0    Blood-Glucose Meter (ONETOUCH ULTRA2) monitoring kit, Use for managing diabetes type 2 on insulin, Disp: 1 Kit, Rfl: 0    cyanocobalamin 1,000 mcg tablet, Take 1,000 mcg by mouth., Disp: , Rfl:     multivitamin (ONE A DAY) tablet, Take 1 Tab by mouth daily. , Disp: , Rfl:     Vascepa 1 gram capsule, Take 2 Capsules by mouth two (2) times daily (with meals).  (Patient not taking: Reported on 4/26/2022), Disp: 360 Capsule, Rfl: 3    miscellaneous medical supply [facility], All supplies needed for bipap (Patient not taking: Reported on 4/26/2022), Disp: , Rfl:         ROS:  Review of Systems - General ROS: negative for - chills, fatigue or fever  Hematological and Lymphatic ROS: negative for - bleeding problems, bruising or jaundice  Respiratory ROS: no cough, shortness of breath, or wheezing  Cardiovascular ROS: no chest pain or dyspnea on exertion  Gastrointestinal ROS: no abdominal pain, change in bowel habits, or black or bloody stools  Neurological ROS: no TIA or stroke symptoms  All other systems negative. Wt Readings from Last 3 Encounters:   04/26/22 (!) 391 lb (177.4 kg)   01/28/22 (!) 353 lb (160.1 kg)   12/07/21 334 lb 12.8 oz (151.9 kg)     Temp Readings from Last 3 Encounters:   12/07/21 97.2 °F (36.2 °C) (Temporal)   01/29/20 97.4 °F (36.3 °C) (Oral)   09/12/19 98.2 °F (36.8 °C) (Oral)     BP Readings from Last 3 Encounters:   04/26/22 130/82   01/28/22 120/72   12/07/21 120/70     Pulse Readings from Last 3 Encounters:   04/26/22 84   01/28/22 83   12/07/21 93           PHYSICAL EXAM:  Visit Vitals  /82   Pulse 84   Ht 5' 6\" (1.676 m)   Wt (!) 391 lb (177.4 kg)   BMI 63.11 kg/m²       Physical Examination: General appearance - alert, well appearing, and in no distress  Mental status - alert, oriented to person, place, and time  Eyes - pupils equal and reactive, extraocular eye movements intact  Neck/lymph - supple, no significant adenopathy  Chest/lungs - clear to auscultation, no wheezes, rales or rhonchi, symmetric air entry  Heart/CV - normal rate, regular rhythm, normal S1, S2, no murmurs, rubs, clicks or gallops  Abdomen/GI - soft, nontender, nondistended, no masses or organomegaly  Musculoskeletal - no joint tenderness, deformity or swelling  Extremities - peripheral pulses normal, no pedal edema, no clubbing or cyanosis  Skin - normal coloration and turgor, no rashes, no suspicious skin lesions noted    EKG: normal EKG, normal sinus rhythm, unchanged from previous tracings. Medications reviewed and questions answered    No results found for this or any previous visit (from the past 672 hour(s)).   Lab Results   Component Value Date/Time    Cholesterol, total 190 03/08/2022 08:40 AM    HDL Cholesterol 40 03/08/2022 08:40 AM LDL, calculated 113 (H) 03/08/2022 08:40 AM    LDL, calculated 39 10/15/2018 11:37 AM    VLDL, calculated 37 03/08/2022 08:40 AM    VLDL, calculated 57 (H) 10/15/2018 11:37 AM    Triglyceride 213 (H) 03/08/2022 08:40 AM    CHOL/HDL Ratio 4.3 04/11/2016 05:35 PM         ASSESSMENT and PLAN        1. Chronic systolic congestive heart failure (HCC)  - Corelnor 10mg daily  -Farxiga 10mg daily  - current regimen effective; continue current regimen    2. Type 2 diabetes mellitus without complication, unspecified whether long term insulin use (HCC)  - current regimen effective; continue current regimen    3. Mixed hyperlipidemia  - current regimen effective; continue current regimen    4. Essential hypertension  - current regimen effective; continue current regimen    5. Morbid obesity with body mass index of 50 or higher (HCC)  - current regimen effective; continue current regimen    6. Gastric bypass status for obesity  - continue to monitor    Patient is on appropriate guideline directed medical therapy for HFrEF with really maximally tolerated doses of Aldactone Jardiance Coreg and ACE inhibitor we have used Corlanor for its FDA indication of maximally treated HFrEF patients who still have a heart rate above 80 or 90. He is having difficulty getting it covered through his insurance carrier      No orders of the defined types were placed in this encounter.                 Ju Mckeon MD  4/26/2022  11:40 AM

## 2022-12-20 NOTE — TELEPHONE ENCOUNTER
Pt needs a prior request sent [employer] so they can send it to [place]. MEDICATION REFILL REQUEST          Name of Medication:  Corlanor  Dose:  5 mg  Frequency:  ?  Quantity:  ?  Days' supply:  ?           Pharmacy Name/Location: [facility] on [address] in [facility]

## 2022-12-22 ENCOUNTER — TELEPHONE (OUTPATIENT)
Dept: ENDOCRINOLOGY | Age: 32
End: 2022-12-22

## 2022-12-27 ENCOUNTER — TELEPHONE (OUTPATIENT)
Dept: ENDOCRINOLOGY | Age: 32
End: 2022-12-27

## 2022-12-30 ENCOUNTER — TELEPHONE (OUTPATIENT)
Dept: ENDOCRINOLOGY | Age: 32
End: 2022-12-30

## 2023-01-31 ENCOUNTER — OFFICE VISIT (OUTPATIENT)
Dept: ENDOCRINOLOGY | Age: 33
End: 2023-01-31
Payer: COMMERCIAL

## 2023-01-31 VITALS
SYSTOLIC BLOOD PRESSURE: 125 MMHG | BODY MASS INDEX: 58.43 KG/M2 | OXYGEN SATURATION: 98 % | WEIGHT: 315 LBS | HEART RATE: 93 BPM | DIASTOLIC BLOOD PRESSURE: 75 MMHG

## 2023-01-31 DIAGNOSIS — I10 ESSENTIAL HYPERTENSION: ICD-10-CM

## 2023-01-31 DIAGNOSIS — E66.01 MORBID OBESITY (HCC): ICD-10-CM

## 2023-01-31 DIAGNOSIS — E11.65 UNCONTROLLED TYPE 2 DIABETES MELLITUS WITH HYPERGLYCEMIA (HCC): ICD-10-CM

## 2023-01-31 DIAGNOSIS — E05.90 SUBCLINICAL HYPERTHYROIDISM: Primary | ICD-10-CM

## 2023-01-31 DIAGNOSIS — I50.22 CHRONIC SYSTOLIC (CONGESTIVE) HEART FAILURE (HCC): ICD-10-CM

## 2023-01-31 LAB
ALBUMIN SERPL-MCNC: 3.8 G/DL (ref 3.5–5)
ALBUMIN/GLOB SERPL: 1 (ref 0.4–1.6)
ALP SERPL-CCNC: 94 U/L (ref 50–136)
ALT SERPL-CCNC: 82 U/L (ref 12–65)
ANION GAP SERPL CALC-SCNC: 6 MMOL/L (ref 2–11)
AST SERPL-CCNC: 37 U/L (ref 15–37)
BASOPHILS # BLD: 0.1 K/UL (ref 0–0.2)
BASOPHILS NFR BLD: 1 % (ref 0–2)
BILIRUB SERPL-MCNC: 1 MG/DL (ref 0.2–1.1)
BUN SERPL-MCNC: 13 MG/DL (ref 6–23)
CALCIUM SERPL-MCNC: 9.6 MG/DL (ref 8.3–10.4)
CHLORIDE SERPL-SCNC: 102 MMOL/L (ref 101–110)
CHOLEST SERPL-MCNC: 236 MG/DL
CO2 SERPL-SCNC: 32 MMOL/L (ref 21–32)
CREAT SERPL-MCNC: 1 MG/DL (ref 0.8–1.5)
CREAT UR-MCNC: 59 MG/DL
DIFFERENTIAL METHOD BLD: NORMAL
EOSINOPHIL # BLD: 0.1 K/UL (ref 0–0.8)
EOSINOPHIL NFR BLD: 2 % (ref 0.5–7.8)
ERYTHROCYTE [DISTWIDTH] IN BLOOD BY AUTOMATED COUNT: 14.2 % (ref 11.9–14.6)
EST. AVERAGE GLUCOSE BLD GHB EST-MCNC: 232 MG/DL
GLOBULIN SER CALC-MCNC: 3.9 G/DL (ref 2.8–4.5)
GLUCOSE SERPL-MCNC: 174 MG/DL (ref 65–100)
HBA1C MFR BLD: 9.7 % (ref 4.8–5.6)
HCT VFR BLD AUTO: 47.9 % (ref 41.1–50.3)
HDLC SERPL-MCNC: 34 MG/DL (ref 40–60)
HDLC SERPL: 6.9
HGB BLD-MCNC: 16.1 G/DL (ref 13.6–17.2)
IMM GRANULOCYTES # BLD AUTO: 0 K/UL (ref 0–0.5)
IMM GRANULOCYTES NFR BLD AUTO: 1 % (ref 0–5)
LDLC SERPL CALC-MCNC: ABNORMAL MG/DL
LDLC SERPL DIRECT ASSAY-MCNC: 111 MG/DL
LYMPHOCYTES # BLD: 1.9 K/UL (ref 0.5–4.6)
LYMPHOCYTES NFR BLD: 29 % (ref 13–44)
MCH RBC QN AUTO: 29 PG (ref 26.1–32.9)
MCHC RBC AUTO-ENTMCNC: 33.6 G/DL (ref 31.4–35)
MCV RBC AUTO: 86.2 FL (ref 82–102)
MICROALBUMIN UR-MCNC: 1.14 MG/DL (ref 0–3)
MICROALBUMIN/CREAT UR-RTO: 19 MG/G (ref 0–30)
MONOCYTES # BLD: 0.5 K/UL (ref 0.1–1.3)
MONOCYTES NFR BLD: 7 % (ref 4–12)
NEUTS SEG # BLD: 4 K/UL (ref 1.7–8.2)
NEUTS SEG NFR BLD: 60 % (ref 43–78)
NRBC # BLD: 0 K/UL (ref 0–0.2)
PLATELET # BLD AUTO: 291 K/UL (ref 150–450)
PMV BLD AUTO: 10.8 FL (ref 9.4–12.3)
POTASSIUM SERPL-SCNC: 4.2 MMOL/L (ref 3.5–5.1)
PROT SERPL-MCNC: 7.7 G/DL (ref 6.3–8.2)
RBC # BLD AUTO: 5.56 M/UL (ref 4.23–5.6)
SODIUM SERPL-SCNC: 140 MMOL/L (ref 133–143)
TRIGL SERPL-MCNC: 579 MG/DL (ref 35–150)
TSH W FREE THYROID IF ABNORMAL: 0.45 UIU/ML (ref 0.36–3.74)
VLDLC SERPL CALC-MCNC: 115.8 MG/DL (ref 6–23)
WBC # BLD AUTO: 6.6 K/UL (ref 4.3–11.1)

## 2023-01-31 PROCEDURE — 3078F DIAST BP <80 MM HG: CPT | Performed by: PHYSICIAN ASSISTANT

## 2023-01-31 PROCEDURE — 99214 OFFICE O/P EST MOD 30 MIN: CPT | Performed by: PHYSICIAN ASSISTANT

## 2023-01-31 PROCEDURE — 3074F SYST BP LT 130 MM HG: CPT | Performed by: PHYSICIAN ASSISTANT

## 2023-01-31 RX ORDER — CARVEDILOL 25 MG/1
25 TABLET ORAL 2 TIMES DAILY
Qty: 60 TABLET | Refills: 11 | Status: SHIPPED | OUTPATIENT
Start: 2023-01-31

## 2023-01-31 RX ORDER — INSULIN LISPRO 100 U/ML
INJECTION, SOLUTION SUBCUTANEOUS
Qty: 15 ML | Refills: 11 | Status: SHIPPED | OUTPATIENT
Start: 2023-01-31

## 2023-01-31 RX ORDER — INSULIN GLARGINE-YFGN 100 [IU]/ML
75 INJECTION, SOLUTION SUBCUTANEOUS 2 TIMES DAILY
Qty: 45 ML | Refills: 11 | Status: SHIPPED | OUTPATIENT
Start: 2023-01-31

## 2023-01-31 RX ORDER — METHIMAZOLE 5 MG/1
2.5 TABLET ORAL DAILY
Qty: 45 TABLET | Refills: 3 | Status: SHIPPED | OUTPATIENT
Start: 2023-01-31

## 2023-01-31 RX ORDER — EMPAGLIFLOZIN 25 MG/1
25 TABLET, FILM COATED ORAL DAILY
Qty: 30 TABLET | Refills: 11 | Status: SHIPPED | OUTPATIENT
Start: 2023-01-31

## 2023-01-31 NOTE — TELEPHONE ENCOUNTER
[employer] medicaid will cover Carvedilol but there is a quantity limit, 30 day supply is covered. The previous Rx was a 90 day supply.

## 2023-01-31 NOTE — PROGRESS NOTES
[facility]    Marcelina Flores PA-C  [facility]  [address], [address]  [place]  Phone [phone]  Facsimile [phone]          Yaneli Roberts is a 33 y.o. male seen 1/31/2023 for follow up evaluation of type 2 diabetes with CHF        Assessment and Plan:      1. Uncontrolled type 2 diabetes mellitus with hyperglycemia ([address]  Patient with type 2 diabetes, labs drawn just prior to this office visit, not yet resulted. We will not get an A1c in the office today. Patient has no blood glucose data for review. Patient reports waking with blood sugars in the 200s and going to bed with blood sugars in the 400s. He acknowledges that supper is his largest meal of the day and 2 days/week he is eating at a [employer]. I contend patient would benefit from single dose prandial insulin and in an effort to encourage him to take his second shot of basal insulin I have encouraged him to take both his second dose of long-acting insulin and 20 units of prandial insulin plus his current 5 per 50 greater than 150 correction before supper. Patient encouraged to monitor blood sugar more regularly for safety and for best results. Risk of hypoglycemia reviewed. Rule of 15's reviewed. Patient aware to reach out to this office if hypoglycemia is noted    - JARDIANCE 25 MG tablet; Take 1 tablet by mouth daily  Dispense: 30 tablet; Refill: 11  - methIMAzole (TAPAZOLE) 5 MG tablet; Take 0.5 tablets by mouth daily  Dispense: 45 tablet; Refill: 3  - Insulin Glargine-yfgn 100 UNIT/ML SOPN; Inject 75 Units into the skin 2 times daily  Dispense: 45 mL; Refill: 11  - ADMELOG SOLOSTAR 100 UNIT/ML SOPN; 20 units AC supper plus correction scale 5/50>150 AC/HS (up to 50 units daily)  Dispense: 15 mL; Refill: 11    2. Subclinical hyperthyroidism  Historically now euthryoid on 2.5mg methimazole     3.  Essential hypertension  BP Readings from Last 3 Encounters:   01/31/23 125/75   12/06/22 110/62   08/29/22 100/62         4. Morbid obesity ([facility]  Patient would certainly benefit from improved glycemic control with agents that are either weight neutral or confer weight loss as a side effect. 5. Chronic systolic (congestive) heart failure ([facility]  Would benefit from agents with cardiovascular endpoint data. Weight loss and Regular exercise applauded and encouraged. Teressa Costa was seen today for diabetes. Diagnoses and all orders for this visit:    Subclinical hyperthyroidism    Uncontrolled type 2 diabetes mellitus with hyperglycemia ([facility]  -     JARDIANCE 25 MG tablet; Take 1 tablet by mouth daily  -     methIMAzole (TAPAZOLE) 5 MG tablet; Take 0.5 tablets by mouth daily  -     Insulin Glargine-yfgn 100 UNIT/ML SOPN; Inject 75 Units into the skin 2 times daily  -     ADMELOG SOLOSTAR 100 UNIT/ML SOPN; 20 units AC supper plus correction scale 5/50>150 AC/HS (up to 50 units daily)    Essential hypertension    Morbid obesity (HCC)    Chronic systolic (congestive) heart failure (HCC)          History of Present Illness:        1/31/2023   Interim diabetes HPI:  Patient reports he is doing well overall exercising daily. Having some difficulty remembering to take his second dose of long-acting insulin. Having difficulty obtaining some medications from cardiology after insurance change.  Was unable to obtain tresiba/novolog, changed to semglee/admelog    Interim medical history changes:    Having difficulty obtaining some medications from cardiology after insurance change    Lifestyle Update:  Walking daily with Vietnamese patel  Light breakfast/lunch  Bigger dinner  Losing weight!!    Current Regimen: Ozempic 2mg on weekends, Jardiance 25mg,      Semglee 75 units twice daily often missing second dose     Ademlog 5/50>150 usually     Glucose data:    Home blood glucose monitoring frequency: 1 times per day    By recall     Typical    Fasting Low- mid 200 200-400   AC lunch     AC supper     Bedtime 300-400      Blood glucose levels are uncontrolled, most significant elevations are after supper    Failed past therapies:       Relevant co morbidities:       He was hospitalized at [address] [place] [address] secondary to congestive heart failure. He weighed 680 pounds at the time of his admission. He has been evaluated by bariatric surgery (Dr. Keila Negro) and gastric  bypass has been recommended, gastric sleeve performed 12/2018. Denies HX pancreatitis, DKA, gastroparesis, foot ulcer    Optho:    Last exam early 2022 with [facility] eye showed no diabetic retinopathy     Obesity:         Body mass index is 58.43 kg/m².        decreasing steadily      Wt Readings from Last 3 Encounters:   01/31/23 (!) 362 lb (164.2 kg)   12/06/22 (!) 385 lb 9.6 oz (174.9 kg)   08/29/22 (!) 393 lb 3.2 oz (178.4 kg)         CardioVascular:    CHF with low EF     Renal:    Under care of nephro? no    On ARB/ACE-I  lisinopril - 40 MG    8/14/2017: Creatinine 0.89 (), urine microalbumin/creatinine 14.8.   10/15/2018      Cr 1.15, GFR 87, microalbumin/Cr ratio 5.7   12/06/2018      Cr 1.30, GFR >60   06/07/2019      Cr 1.05, GFR 97   12/07/2021     Cr 0.81,   03/08/2022          Cr 0.90, , microalbumin/Cr ratio 15    08/29/2022 Cr 1.10, GFR >60       Lipids:     Current therapy fenofibrate - 145 MG  Icosapent Ethyl Caps - 1 g  rosuvastatin - 40 MG with good compliance    03/08/2022  TC- 190, LDL- 113, VLDL- 37,  HDL- 40, TG- 213        Lab Results   Component Value Date    CHOL 190 03/08/2022    CHOL 256 (H) 12/07/2021     Lab Results   Component Value Date    LDLCALC 113 (H) 03/08/2022    LDLCALC 100 (H) 12/07/2021      Lab Results   Component Value Date    VLDL 37 03/08/2022    VLDL 126 (H) 12/07/2021      Lab Results   Component Value Date    HDL 40 03/08/2022    HDL 30 (L) 12/07/2021      Lab Results   Component Value Date    HDL 40 03/08/2022    HDL 30 (L) 12/07/2021      Lab Results   Component Value Date    TRIG 213 (H) 03/08/2022    TRIG 731 ([place]) 12/07/2021     No results found for: URIELO      Hemoglobin A1c:   8/14/2017: 11.9.   11/14/2017: 5.8.   3/7/2018: 5.8.   7/16/2018: 6.2.   10/15/2018: 5.1   03/07/2019: 5.1%   06/07/2019: 5.7%   09/25/2019: 5.8%      12/07/2021: 12.0%  03/08/2022: 8.4%  08/29/2022: 9.1%  Hemoglobin A1C, POC   Date Value Ref Range Status   09/25/2019 5.8 % Final   06/07/2019 5.7 % Final        Thyroid:   patient was found to have suppressed TSH with negative TSI and unremarkable thyroid ultrasound. He is on 2.5 mg of methimazole with improvement of his thyroid function tests, no change in condition       TSH:   4/11/2016: TSH 0.808.   2/21/2018: TSH 0.768, free T4 1.20.   10/15/2018: TSH 0.362, free  T4 1.44   11/02/2018: TSH 0.280, free  T4 1.36   03/07/2019: TSH 0.402, free T4 1.19, total T3 107    06/07/2019: TSH 0.811, Free T4 133, total T3 141 (AST 18, ALT 24) ( on 2.5mg methimazole)  03/08/2022: TSH 0.722, free T4 1.23  06/01/2022: TSH 0.844, Free T4 1.3, total T3 1.0                          Thyroid Antibodies               TPO     14 (negative)                           11/02/2018                     TSI       <0.10 (negative)                      12/06/2018               Thyroglobulin ab <1.0 (negative)        11/02/2018 11/12/2018   Thyroid ultrasound. CLINICAL INDICATION: Abnormal thyroid function tests       PROCEDURE: Real-time grayscale sonographic evaluation of the thyroid gland with   color Doppler interrogation. COMPARISON: No prior       FINDINGS:       RIGHT THYROID LOBE: The right thyroid lobe measures 6.2 x 2.9 x 2.3 cm. The   echotexture is homogeneous. No nodules identified. LEFT THYROID LOBE:  The left thyroid lobe measures 6.2 x 2.6 x 2.5 cm. The   echotexture is homogeneous. No nodules identified.        The isthmus measures 5 mm IMPRESSION   IMPRESSION: Unremarkable thyroid ultrasound. Lab Results   Component Value Date/Time    TSH 0.722 03/08/2022 08:40 AM    TSH 0.550 12/07/2021 12:25 PM    TSH 0.811 06/07/2019 12:20 PM          Reviewed and updated this visit by provider:  Tobacco  Allergies  Meds  Problems  Med Hx  Surg Hx  Fam Hx                    Allergies & Medications:  Reviewed in chart. Review of Systems    Vital Signs:  /75 (Site: Right Lower Arm, Position: Sitting)   Pulse 93   Wt (!) 362 lb (164.2 kg)   SpO2 98%   BMI 58.43 kg/m²       Physical Exam  Constitutional:       Appearance: Normal appearance. He is obese. Neck:      Thyroid: No thyromegaly. Cardiovascular:      Rate and Rhythm: Normal rate and regular rhythm. Pulmonary:      Effort: Pulmonary effort is normal.      Breath sounds: Normal breath sounds. Abdominal:      Palpations: Abdomen is soft. Feet:      Right foot:      Protective Sensation: 3 sites tested. 3 sites sensed. Left foot:      Protective Sensation: 3 sites tested. 3 sites sensed. Comments: Debris from walking barefoot caked onto plantar aspect of feet  Lymphadenopathy:      Cervical: No cervical adenopathy. Skin:     General: Skin is warm and dry. Neurological:      General: No focal deficit present. Mental Status: He is alert. Psychiatric:         Mood and Affect: Mood normal.         Behavior: Behavior normal.         Thought Content: Thought content normal.         Judgment: Judgment normal.           Return 3-4 mo, for Diabetes DM2 Follow-Up. Portions of this note were generated with the assistance of voice recogniton software. As such, some errors in transcription may be present.

## 2023-01-31 NOTE — TELEPHONE ENCOUNTER
[facility] that he has no been able to get his CARVEDILOL 25MG he no longer has his [employer] coverage and only has [employer] which is a form of medicaid he states that [employer] is not wanting to pay for this medication pt wants to be advised what to do next

## 2023-02-13 ENCOUNTER — TELEPHONE (OUTPATIENT)
Dept: ENDOCRINOLOGY | Age: 33
End: 2023-02-13

## 2023-02-13 DIAGNOSIS — E66.01 MORBID OBESITY (HCC): ICD-10-CM

## 2023-02-13 DIAGNOSIS — I50.22 CHRONIC SYSTOLIC (CONGESTIVE) HEART FAILURE (HCC): ICD-10-CM

## 2023-02-13 DIAGNOSIS — E11.65 UNCONTROLLED TYPE 2 DIABETES MELLITUS WITH HYPERGLYCEMIA (HCC): Primary | ICD-10-CM

## 2023-02-13 NOTE — TELEPHONE ENCOUNTER
Cholesterol and GLUCOSE labs are markedly elevated. I will refer to diabetes education. May need additional medical nutrition therapy    Please explain to patient that diabetes and high cholesterol together drastically increase his risk of heart disease, stroke at an early age, kidney failure, erectile dysfunction, and blindness.     Please confirm he is taking all of his medication as prescribed including rosuvastatin 40mg, his insulin at the 4 befores, his weekly ozempic, daily jardiance    Please ask pt if he continues to walk every day for exercise    He is at high risk for complications and I would like him to work on lifestyle changes to improve these disease states

## 2023-02-14 ENCOUNTER — TELEPHONE (OUTPATIENT)
Dept: DIABETES SERVICES | Age: 33
End: 2023-02-14

## 2023-02-14 NOTE — TELEPHONE ENCOUNTER
Spoke to pt relayed prov message he expressed understanding. I asked how are you taking your meds he confirmed the correct dosage + time of day when taking meds. He reports he walks daily.     Pt was concerned with the financial responsibility of the diab edu ,concerned if [employer] will cover class - informed once they contact him he can discuss this in detail with the.

## 2023-02-14 NOTE — TELEPHONE ENCOUNTER
Call to patient about DME referral. [address] should be $3.40 per visit. But if want can call [facility] and verify.   Scheduled for 2-21-23 at 8 AM.

## 2023-02-16 ENCOUNTER — APPOINTMENT (OUTPATIENT)
Dept: PHYSICAL THERAPY | Age: 33
End: 2023-02-16
Payer: COMMERCIAL

## 2023-02-20 ENCOUNTER — APPOINTMENT (OUTPATIENT)
Dept: PHYSICAL THERAPY | Age: 33
End: 2023-02-20
Payer: COMMERCIAL

## 2023-02-21 ENCOUNTER — HOSPITAL ENCOUNTER (OUTPATIENT)
Dept: DIABETES SERVICES | Age: 33
Setting detail: RECURRING SERIES
Discharge: HOME | End: 2023-02-24
Payer: COMMERCIAL

## 2023-02-21 PROCEDURE — G0108 DIAB MANAGE TRN  PER INDIV: HCPCS

## 2023-02-21 SDOH — ECONOMIC STABILITY: FOOD INSECURITY: ADDITIONAL INFORMATION: YES

## 2023-02-21 ASSESSMENT — PROBLEM AREAS IN DIABETES QUESTIONNAIRE (PAID)
WORRYING ABOUT THE FUTURE AND THE POSSIBILITY OF SERIOUS COMPLICATIONS: 2
FEELING SCARED WHEN YOU THINK ABOUT LIVING WITH DIABETES: 1
FEELING DEPRESSED WHEN YOU THINK ABOUT LIVING WITH DIABETES: 1
FEELING THAT DIABETES IS TAKING UP TOO MUCH OF YOUR MENTAL AND PHYSICAL ENERGY EVERY DAY: 0
COPING WITH COMPLICATIONS OF DIABETES: 0
PAID-5 TOTAL SCORE: 4

## 2023-02-21 ASSESSMENT — PATIENT HEALTH QUESTIONNAIRE - PHQ9
SUM OF ALL RESPONSES TO PHQ QUESTIONS 1-9: 1

## 2023-02-21 NOTE — PROGRESS NOTES
Came for diabetes educational assessment today. Provided basic information on carbohydrates, proteins and fats. Educational need/plan: Will attend 2 nutrition/2 diabetes sessions to address the following: diabetes disease process, nutritional management, physical activity, using medications, preventing complications, psychosocial adjustment, goal setting, problem solving, monitoring, behavior change strategies. Hopes to gain the following from this educational program:  Being active, healthy coping, healthy eating, monitoring blood sugars, problem solving, taking medications and reducing risk of complications. Issues Identified:  - He lives with mom and two brothers . Reports one has autism. - Mom will come with him to education.  - Have difficulty getting food. They have been going to the food bank. They will eat at churches about once a week on average.  - Mom agrees to [employer] consult. - He cannot eat the consistence of milk as it will make him throw up. - He had gastric bypass. He lost from 700 pounds to 366 pounds currently. - He has four dogs that includes  a new young [address]. He reports his activity is daily walking the dog and walks two hours. - He admits to eating sweets \"now and then\"       Medication Reconciliation completed at today's visit.

## 2023-02-22 ENCOUNTER — CARE COORDINATION (OUTPATIENT)
Dept: CARE COORDINATION | Facility: CLINIC | Age: 33
End: 2023-02-22

## 2023-02-22 ENCOUNTER — OFFICE VISIT (OUTPATIENT)
Dept: INTERNAL MEDICINE CLINIC | Facility: CLINIC | Age: 33
End: 2023-02-22
Payer: COMMERCIAL

## 2023-02-22 VITALS
SYSTOLIC BLOOD PRESSURE: 104 MMHG | HEIGHT: 66 IN | OXYGEN SATURATION: 97 % | WEIGHT: 315 LBS | DIASTOLIC BLOOD PRESSURE: 60 MMHG | HEART RATE: 95 BPM | BODY MASS INDEX: 50.62 KG/M2

## 2023-02-22 DIAGNOSIS — R60.0 BILATERAL LOWER EXTREMITY EDEMA: Primary | ICD-10-CM

## 2023-02-22 PROCEDURE — 3074F SYST BP LT 130 MM HG: CPT | Performed by: INTERNAL MEDICINE

## 2023-02-22 PROCEDURE — 3078F DIAST BP <80 MM HG: CPT | Performed by: INTERNAL MEDICINE

## 2023-02-22 PROCEDURE — 99213 OFFICE O/P EST LOW 20 MIN: CPT | Performed by: INTERNAL MEDICINE

## 2023-02-22 SDOH — ECONOMIC STABILITY: INCOME INSECURITY: HOW HARD IS IT FOR YOU TO PAY FOR THE VERY BASICS LIKE FOOD, HOUSING, MEDICAL CARE, AND HEATING?: NOT VERY HARD

## 2023-02-22 SDOH — ECONOMIC STABILITY: FOOD INSECURITY: WITHIN THE PAST 12 MONTHS, THE FOOD YOU BOUGHT JUST DIDN'T LAST AND YOU DIDN'T HAVE MONEY TO GET MORE.: NEVER TRUE

## 2023-02-22 SDOH — ECONOMIC STABILITY: HOUSING INSECURITY
IN THE LAST 12 MONTHS, WAS THERE A TIME WHEN YOU DID NOT HAVE A STEADY PLACE TO SLEEP OR SLEPT IN A SHELTER (INCLUDING NOW)?: NO

## 2023-02-22 SDOH — ECONOMIC STABILITY: FOOD INSECURITY: WITHIN THE PAST 12 MONTHS, YOU WORRIED THAT YOUR FOOD WOULD RUN OUT BEFORE YOU GOT MONEY TO BUY MORE.: NEVER TRUE

## 2023-02-22 ASSESSMENT — ENCOUNTER SYMPTOMS
CHEST TIGHTNESS: 0
WHEEZING: 0
PHOTOPHOBIA: 0
SHORTNESS OF BREATH: 0
ABDOMINAL DISTENTION: 0

## 2023-02-22 ASSESSMENT — ANXIETY QUESTIONNAIRES
4. TROUBLE RELAXING: 0
2. NOT BEING ABLE TO STOP OR CONTROL WORRYING: 0
5. BEING SO RESTLESS THAT IT IS HARD TO SIT STILL: 0
3. WORRYING TOO MUCH ABOUT DIFFERENT THINGS: 0
1. FEELING NERVOUS, ANXIOUS, OR ON EDGE: 0
6. BECOMING EASILY ANNOYED OR IRRITABLE: 0
GAD7 TOTAL SCORE: 0
7. FEELING AFRAID AS IF SOMETHING AWFUL MIGHT HAPPEN: 0

## 2023-02-22 ASSESSMENT — PATIENT HEALTH QUESTIONNAIRE - PHQ9
1. LITTLE INTEREST OR PLEASURE IN DOING THINGS: 0
SUM OF ALL RESPONSES TO PHQ QUESTIONS 1-9: 0
SUM OF ALL RESPONSES TO PHQ QUESTIONS 1-9: 0
2. FEELING DOWN, DEPRESSED OR HOPELESS: 0
SUM OF ALL RESPONSES TO PHQ QUESTIONS 1-9: 0
SUM OF ALL RESPONSES TO PHQ9 QUESTIONS 1 & 2: 0
SUM OF ALL RESPONSES TO PHQ QUESTIONS 1-9: 0

## 2023-02-22 NOTE — PROGRESS NOTES
Chief Complaint   Patient presents with    Referral - General     Needs referral for his Lymphedema. Elizabeth Gabriel is a 33 y.o. male who presents today for Referral - General (Needs referral for his Lymphedema. )    He is here concerned about bilateral lower extremity discomfort, and swelling, as well as varicose veins, this is a chronic ongoing issue, has a history of diabetes, congestive heart failure, is currently working with endocrine, to decrease his sugar levels, and since recent changes, he is started noticing that his sugars are now down in the 200s. He reports compliance with insulin, still dietary indiscretion, but is trying to walk more. He denies any signs of dyspnea, but the chronic lower extremity discomfort, and is requesting evaluation by lymphedema clinic. Wt Readings from Last 3 Encounters:   02/22/23 (!) 374 lb (169.6 kg)   01/31/23 (!) 362 lb (164.2 kg)   12/06/22 (!) 385 lb 9.6 oz (174.9 kg)     Vitals:    02/22/23 1133   BP: 104/60   Site: Left Wrist   Position: Sitting   Pulse: 95   SpO2: 97%   Weight: (!) 374 lb (169.6 kg)   Height: 5' 6\" (1.676 m)        Assessment and plan:  1. Bilateral lower extremity edema  -     [facility], [address] [place] [address]    Chronic bilateral pain could be associated with his morbid obesity, he is down more than 200 pounds, since he had the parotid surgery, still have some varicose veins, lower extremity swelling, I will be placing the referral for lymphedema clinic, as requested, but he will need to continue his efforts of losing weight, managing your diabetes. Review of system:    Review of Systems   Constitutional:  Negative for activity change, fatigue and unexpected weight change. Eyes:  Negative for photophobia and visual disturbance. Respiratory:  Negative for chest tightness, shortness of breath and wheezing. Cardiovascular:  Positive for leg swelling. Negative for chest pain and palpitations. Gastrointestinal:  Negative for abdominal distention. Genitourinary:  Negative for difficulty urinating and frequency. Musculoskeletal:  Positive for arthralgias. Negative for myalgias. Neurological:  Negative for dizziness and headaches.        Immunization history:    Immunization History   Administered Date(s) Administered    COVID-19, PFIZER PURPLE top, DILUTE for use, (age 15 y+), 30mcg/0.3mL 03/19/2021, 04/11/2021, 11/23/2021    DTP 01/03/1992, 03/06/1992, 05/12/1992, 06/11/1993    Hepatitis A Adult (Havrix, Vaqta) 09/01/2018, 03/01/2019    Hepatitis A Vaccine 09/01/2018, 03/01/2019    Hepatitis B 09/01/2018, 10/01/2018, 03/01/2019    Hepatitis B vaccine 09/01/2018, 10/01/2018, 03/01/2019    Hib vaccine 01/06/1992, 03/06/1992, 05/12/1992, 06/11/1993    Influenza Virus Vaccine 09/01/2018, 09/26/2019, 08/31/2020, 09/13/2021    Influenza, FLUARIX, FLULAVAL, Frosty Loll (age 10 mo+) AND AFLURIA, (age 1 y+), PF, 0.5mL 09/01/2018, 09/26/2019, 08/31/2020    Influenza, FLUCELVAX, (age 10 mo+), MDCK, PF, 0.5mL 08/29/2017    MMR 06/11/1993    Pneumococcal Polysaccharide ([id number]) 04/14/2016    Polio OPV 01/03/1992, 03/06/1992, 06/11/1993    Tdap (Boostrix, Adacel) 09/01/2018       Current medications:      Current Outpatient Medications:     JARDIANCE 25 MG tablet, Take 1 tablet by mouth daily, Disp: 30 tablet, Rfl: 11    methIMAzole (TAPAZOLE) 5 MG tablet, Take 0.5 tablets by mouth daily, Disp: 45 tablet, Rfl: 3    Insulin Glargine-yfgn 100 UNIT/ML SOPN, Inject 75 Units into the skin 2 times daily, Disp: 45 mL, Rfl: 11    ADMELOG SOLOSTAR 100 UNIT/ML SOPN, 20 units AC supper plus correction scale 5/50>150 AC/HS (up to 50 units daily), Disp: 15 mL, Rfl: 11    carvedilol (COREG) 25 MG tablet, Take 1 tablet by mouth 2 times daily, Disp: 60 tablet, Rfl: 11    OZEMPIC, 2 MG/DOSE, 8 MG/3ML SOPN, Inject 2 mg into the skin every 7 days, Disp: 3 mL, Rfl: 11    fenofibrate (TRICOR) 145 MG tablet, Take 1 tablet by mouth daily, Disp: 90 tablet, Rfl: 3    furosemide (LASIX) 20 MG tablet, Take 1 tablet by mouth daily, Disp: 90 tablet, Rfl: 3    lisinopril (PRINIVIL;ZESTRIL) 40 MG tablet, Take 0.5 tablets by mouth daily, Disp: 90 tablet, Rfl: 3    rosuvastatin (CRESTOR) 40 MG tablet, Take 1 tablet by mouth every evening, Disp: 90 tablet, Rfl: 3    spironolactone (ALDACTONE) 25 MG tablet, Take 1 tablet by mouth daily, Disp: 90 tablet, Rfl: 3    Lancets (ONETOUCH DELICA PLUS [id number]) MISC, USE TO TEST 3 TIMES DAILY, Disp: , Rfl:     ONETOUCH VERIO strip, Use to check blood glucose four times daily E11.65, Disp: 400 each, Rfl: 3    Blood Glucose Monitoring Suppl (ONETOUCH VERIO) w/Device KIT, Use to check blood glucose four times daily E11.65, Disp: 1 kit, Rfl: 0    Lancets (ONETOUCH DELICA PLUS [id number]) MISC, Use to check blood glucose four times daily E11.65, Disp: 400 each, Rfl: 3    ACCU-CHEK GUIDE strip, Check blood glucose three times daily. Dx E11.65, Disp: 300 each, Rfl: 3    Insulin Pen Needle 32G X 6 MM MISC, Use with insulin up to 4 times daily, E11.65, Disp: 400 each, Rfl: 3    Cholecalciferol (D3 HIGH POTENCY) 50 MCG (2000 UT) CAPS, Take 1 capsule by mouth daily, Disp: 90 capsule, Rfl: 3    CALCIUM PO, Take by mouth, Disp: , Rfl:     cyanocobalamin 1000 MCG tablet, Take 1,000 mcg by mouth, Disp: , Rfl:     Sod Fluoride-Potassium Nitrate 1.1-5 % PSTE, BRUSH TEETH FOR 2 MINUTES TWICE A DAY. EXPECTORATE.  DO NOT EAT OR DINK AFTER BRUSHING, Disp: , Rfl:     ivabradine (CORLANOR) 5 MG TABS tablet, Take 1 tablet by mouth 2 times daily (with meals) (Patient not taking: No sig reported), Disp: 60 tablet, Rfl: 11    Icosapent Ethyl (VASCEPA) 1 g CAPS capsule, Take 2 capsules by mouth 2 times daily (with meals) (Patient not taking: No sig reported), Disp: 180 capsule, Rfl: 3      Family history:    Family History   Problem Relation Age of Onset    Diabetes Paternal Grandfather     Diabetes Father     Heart Disease Paternal Grandfather     Hypertension Paternal Grandmother     Heart Disease Paternal Grandmother     Hypertension Maternal Grandfather     Heart Disease Maternal Grandfather     Hypertension Maternal Grandmother     Heart Disease Maternal Grandmother     Stroke Mother     Hypertension Mother     Diabetes Mother     Heart Disease Father     Hypertension Father     Hypertension Paternal Grandfather         Past medical history:    Past Medical History:   Diagnosis Date    Anasarca     Congestive heart failure ([facility] [address]     sees Dr Becerril Nurse    Hypertension     Lazy eye of right side     had since infancy    Mixed hyperlipidemia     Morbid obesity ([facility] [address]     NAFLD (nonalcoholic fatty liver disease)     MARK (obstructive sleep apnea)     Type 2 diabetes mellitus ([facility] [address]         Past Surgical History:   Procedure Laterality Date    GASTRIC BYPASS SURGERY  12/26/2018    gastric     TONSILLECTOMY          Physical exam:    /60 (Site: Left Wrist, Position: Sitting)   Pulse 95   Ht 5' 6\" (1.676 m)   Wt (!) 374 lb (169.6 kg)   SpO2 97%   BMI 60.37 kg/m²     Physical Exam  Vitals reviewed. Constitutional:       Appearance: Normal appearance. He is obese. HENT:      Head: Normocephalic and atraumatic. Cardiovascular:      Rate and Rhythm: Normal rate and regular rhythm. Pulmonary:      Effort: Pulmonary effort is normal.      Breath sounds: Normal breath sounds. Musculoskeletal:      Right lower leg: Edema (Varicosities BL) present. Left lower leg: Edema present. Neurological:      General: No focal deficit present. Mental Status: He is alert and oriented to person, place, and time.    Psychiatric:         Mood and Affect: Mood normal.        Recent labs:    Hemoglobin A1C   Date Value Ref Range Status   01/31/2023 9.7 (H) 4.8 - 5.6 % Final        Lab Results   Component Value Date    CHOL 236 (H) 01/31/2023    CHOL 190 03/08/2022    CHOL 256 (H) 12/07/2021     Lab Results   Component Value Date    TRIG 579 (H) 01/31/2023    TRIG 213 (H) 03/08/2022    TRIG 731 (HH) 12/07/2021     Lab Results   Component Value Date    HDL 34 (L) 01/31/2023    HDL 40 03/08/2022    HDL 30 (L) 12/07/2021     Lab Results   Component Value Date    [facility] Not calculated due to elevated triglyceride level 01/31/2023    LDLCALC 113 (H) 03/08/2022    LDLCALC 100 (H) 12/07/2021     Lab Results   Component Value Date    LABVLDL 115.8 (H) 01/31/2023    VLDL 37 03/08/2022    VLDL 126 (H) 12/07/2021     Lab Results   Component Value Date    CHOLHDLRATIO 6.9 01/31/2023       Lab Results   Component Value Date    TSH 0.722 03/08/2022    HKP7BGP 0.844 06/01/2022       Lab Results   Component Value Date     01/31/2023    K 4.2 01/31/2023     01/31/2023    CO2 32 01/31/2023    BUN 13 01/31/2023    CREATININE 1.00 01/31/2023    GLUCOSE 174 (H) 01/31/2023    CALCIUM 9.6 01/31/2023    PROT 7.7 01/31/2023    LABALBU 3.8 01/31/2023    BILITOT 1.0 01/31/2023    ALKPHOS 94 01/31/2023    AST 37 01/31/2023    ALT 82 (H) 01/31/2023    LABGLOM >60 01/31/2023    GFRAA >60 08/29/2022    AGRATIO 1.2 03/08/2022    GLOB 3.9 01/31/2023       Lab Results   Component Value Date    WBC 6.6 01/31/2023    HGB 16.1 01/31/2023    HCT 47.9 01/31/2023    MCV 86.2 01/31/2023     01/31/2023             This document was generated with the aid of voice recognition software. . Please be aware that there may be inadvertent transcription errors not identified and corrected by the [employer] (1) Other Diagnosis

## 2023-02-22 NOTE — CARE COORDINATION
YOLIS POOL in receipt of referral from FERMÍN Muñoz RN re food insecurity. Given pt is Medicaid only, referral will be addressed by the [employer] staff. YOLIS POOL spoke with pt's mother, Ms. Soto, today. Provided her with # for Ai Mijares RN with [facility]. Ms. Nathaniel Ashley can assist with food resources and/or refer to [employer]  worker, if indicated. Ms. Slava Maza was provided with this writer's contact # if unable to link with ENRICO Ashley.      Cc: Valentina Huitron RN

## 2023-02-23 ENCOUNTER — HOSPITAL ENCOUNTER (OUTPATIENT)
Dept: PHYSICAL THERAPY | Age: 33
Setting detail: RECURRING SERIES
Discharge: HOME | End: 2023-02-26
Payer: COMMERCIAL

## 2023-02-23 PROCEDURE — 97140 MANUAL THERAPY 1/> REGIONS: CPT

## 2023-02-23 PROCEDURE — 97165 OT EVAL LOW COMPLEX 30 MIN: CPT

## 2023-02-23 PROCEDURE — 97535 SELF CARE MNGMENT TRAINING: CPT

## 2023-02-23 NOTE — THERAPY EVALUATION
Krissy Jones  : 1990  Primary: [address] (Medicaid Managed)  Secondary:  [address] @ [facility]  [address] [facility] [address]  Phone: [phone]  Fax: [phone]    OT Visit Info:  Plan Frequency: 2x a week until goals met  Certification Period Expiration Date: 23  Total # of Visits Approved: 12  Total # of Visits to Date: 1      OUTPATIENT OCCUPATIONAL THERAPY:OP NOTE TYPE: Initial Assessment 2023  Episode: (BLE lymphedema)   Appt Desk        Treatment Diagnosis:  I89.022 Lymphedema with obesity  _  _  R53.83 Other fatigue    Medical/Referring Diagnosis:  Bilateral lower extremity edema [R60.0]  Referring Physician:  Sanjuanita Mckeon MD MD Orders:  OT Eval and Treat   Return MD Appt:  TBD  Date of Onset:  Onset Date: 20   Allergies:  Bee venom, Other, Barium iodide, and Bee pollen  Restrictions/Precautions:    No data recorded  No data recorded  Medications Last Reviewed:  2023     SUBJECTIVE   History of Injury/Illness (Reason for Referral):    Per Mio Mg MD on 23    1. Bilateral lower extremity edema  -     [facility]     Chronic bilateral pain could be associated with his morbid obesity, he is down more than 200 pounds, since he had the parotid surgery, still have some varicose veins, lower extremity swelling, I will be placing the referral for lymphedema clinic, as requested, but he will need to continue his efforts of losing weight, managing your diabetes.     Patient Stated Goal(s):  \"Walk without pain and reduce swelling\"  Initial:      5/10 Post Session:      4/10  Past Medical History/Comorbidities:   Mr. rTesa Shepard  has a past medical history of Anasarca, Congestive heart failure ([address] Hypertension, Lazy eye of right side, Mixed hyperlipidemia, Morbid obesity ([address] NAFLD (nonalcoholic fatty liver disease), MARK (obstructive sleep apnea), and Type 2 diabetes mellitus ([facility] [address] Mr. Lupe Streeter  has a past surgical history that includes Gastric bypass surgery (12/26/2018) and Tonsillectomy. Social History/Living Environment:   Lives With: Parent  Type of Home: House  Bathroom Toilet: Standard     Prior Level of Function/Work/Activity:   Prior level of function: Independent  Occupation: On disability  ADL Assistance: Independent  Homemaking Assistance: Independent  Ambulation Assistance: Independent  No data recorded     Learning:   Does the patient/guardian have any barriers to learning?: No barriers     Fall Risk Scale  Weiss Total Score: 0  Weiss Fall Risk: Low (0-24)           OBJECTIVE   Lymphedema:  PRETREATMENT AFFECTED LIMB(s): lower extremity      Date:  2/23/23       Right / Left         Groin   []      []         8 inches   []      []         4 inches   []      [] R51  L54      PoplitealSpace   []      [] R44  L43        8 inches   []      [] R52.5  L52        4 inches   []      [] R40  L37.5        Ankle   []      [] R26  L25.5        Instep   []      [] R25  L25.5      Measurements are taken in centimeters:  2.54 cm = 1 inch   Cumulative circumferential volumetric graph measurements  RLE total size 238.5        LLE total size 237.5               Skin Integumentary:  Location Description: BLE calf and foot  Skin Integrity: Intact  Pitting Scale Area 1: 2+  Skin Texture: Dry  Edema Rebound: Quick  Stemmer Sign: Negative  LLIS:  Total Score: 31    ASSESSMENT   Initial Assessment:  Patient presents here with BLE lymphedema. Hx of Gastric sleeve surgery and congestive heart failure. He is at stage 1 and will benefit from CDT and MLD for reduction with transition to compression garments to wear daily. His mother also gets lymphedema therapy and familiar with education surrounding the dx. Patient will begin tx this date. Per family he has lost 250 pounds in 2-3 years. Initiate OT.     Problem List (Impacting functional limitations):  Performance deficits / Impairments: Decreased functional mobility ; Decreased strength     Therapy Prognosis:   Prognosis: Good     Assessment Complexity:   Low Complexity  PLAN   Effective Dates: 2/20/38  TO Certification Period Expiration Date: 05/24/23 . Frequency/Duration: Plan Frequency: 2x a week until goals met   Interventions Planned: (Treatment may consist of any combination of the following)  Current Treatment Recommendations: Strengthening; ROM; Equipment evaluation, education, & procurement; Patient/Caregiver education & training; Safety education & training; Self-Care / ADL; Positioning; Manual Therapy:  MLD     Short-Term Functional Goals: Time Frame: 30 days  1. The patient/caregiver will verbalize understanding of lymphedema precautions. 2. Patient will be minimal assist with skin care regimen to decrease risk of cellulitis. 3. The patient/caregiver will be minimal assist at donning and doffing bilateral lower extremity compression bandages. 4. The patient/caregiver will be independent with self-manual lymph drainage techniques and show decrease in limb volume. 5. Patient will be independent in lymphatic exercises. Discharge Goals: Time Frame: 90 days  1. Patient's bilateral lower extremity circumferential measurements will decrease on volumetric graph to maximize functional use in ADL's.    2. The patient/caregiver will be independent with home management of lymphedema. 3. Patient/caregiver will be independent donning and doffing bilateral lower extremity compression garment. MEDICAL NECESSITY:   > Skilled intervention continues to be required due to Deficits ntoed above. REASON FOR SERVICES/OTHER COMMENTS:  > Patient continues to require skilled intervention due to Dx above. Total Duration:  Time In: 0900  Time Out: 1000    Regarding [facility] therapy, I certify that the treatment plan above will be carried out by a therapist or under their direction.   Thank you for this referral,  Myrna Araujo OT     Referring Physician Signature: Natalie Emmanuel* _______________________________ Date _____________        Post Session Pain  Charge Capture   POC Link  Treatment Note Link  MD Guidelines  Koko

## 2023-02-26 NOTE — PROGRESS NOTES
Krissy Jones  : 1990  Primary: [address] (Medicaid Managed)  Secondary:  [address] @ [facility]  [address] [facility] [address]  Phone: [phone]  Fax: [phone]    OT Visit Info:   Plan Frequency: 2x a week until goals met  Certification Period Expiration Date: 23  Total # of Visits Approved: 12  Total # of Visits to Date: 1        OUTPATIENT OCCUPATIONAL THERAPY: Treatment Note 2023  Episode: (BLE lymphedema)   Appt Desk      Treatment Diagnosis:  I89.022 Lymphedema with obesity  _  _  R53.83 Other fatigue  Medical/Referring Diagnosis:  Bilateral lower extremity edema [R60.0]  Referring Physician:  Sanjuanita Mckeon MD MD Orders:  OT Eval and Treat   Date of Onset:  Onset Date: 20     Allergies:  Bee venom, Other, Barium iodide, and Bee pollen  Restrictions/Precautions:    No data recorded  No data recorded  Medications Last Reviewed:  2023     Interventions Planned: (Treatment may consist of any combination of the following)  Current Treatment Recommendations: Strengthening; ROM; Equipment evaluation, education, & procurement; Patient/Caregiver education & training; Safety education & training; Self-Care / ADL; Positioning; Manual Therapy:  MLD     Subjective Comments:     Initial:      /10 Post Session:      /10  Medications Last Reviewed:  2023  Updated Objective Findings:  See evaluation note from today  Treatment   Manual Lymph Drainage: 15 minutes. BLE with legs elevated, patient sitting up in tx recliner.    Lymph Nodes:    Cervical Supraclavicular Axillary Abdominal Inguinal Popliteal Antecubital   RIGHT []     []     []     []     []     []     []       LEFT []     []     []     []     []     []     []         Anastamoses:   Axillo-axillary Inguino-inguinal Axillo-inguinal Inguino-axillary   ANTERIOR []     []     []     []       POSTERIOR []     []     []     []       RIGHT []     []     [] []       LEFT []     []     []     []         Limbs:   []    RUE     []    LUE     [x]    RLE    [x]    LLE    Self Care: (30 minutes): Due to visible dirt on feet started with washcloth and soap for legs. Eucerin used for skin care. Cotton Stockinet for BLE's, 1/8 Rosidal foam for calves, 3 short stretch bandages each leg. To remove if SOB or painful. To wear until morning of next visit and don biagrip after shower. Treatment/Session Summary:    Treatment Assessment:  See evaluation note this date. Communication/Consultation:  None today  Equipment provided today:  None  Recommendations/Intent for next treatment session: Next visit will focus on continuation of CDT and MLD. Total Treatment Billable Duration: 60 minutes  OT Individual Minutes  Time In: 0900  Time Out: 1000  Minutes: [address] [place] [address]    Post Session Pain  Charge Capture   POC Link  Treatment Note Link  MD Guidelines  MyChart    Future Appointments   Date Time Provider Dulce Frey   2/27/2023  9:00 AM Lamont Boland OT [facility]   2/28/2023  9:00 AM Iwona Morse MD [facility] AMB   3/2/2023  8:00 AM Lamont Boland OT [facility]   3/13/2023  9:00 AM Danyell Muñoz RN [facility]   3/23/2023  9:00 AM Carolyn Perkins RD [facility]   3/27/2023  9:00 [facility]   3/28/2023  9:00 AM Carolyn Perkins RD [facility]   5/3/2023 11:00 AM BRETT Matthew [facility] AM[facility]   6/14/2023 10:15 AM Landen Abdul MD [facility] AM[facility]

## 2023-02-27 ENCOUNTER — HOSPITAL ENCOUNTER (OUTPATIENT)
Dept: PHYSICAL THERAPY | Age: 33
Setting detail: RECURRING SERIES
Discharge: HOME | End: 2023-03-02
Payer: COMMERCIAL

## 2023-02-27 PROCEDURE — 97140 MANUAL THERAPY 1/> REGIONS: CPT

## 2023-02-27 PROCEDURE — 97535 SELF CARE MNGMENT TRAINING: CPT

## 2023-02-27 NOTE — PROGRESS NOTES
Ariana Fenton  : 1990  Primary: [address] (Medicaid Managed)  Secondary:  [address] @ [facility]  [address] [facility] [address] [address]  Phone: [phone]  Fax: [phone]    OT Visit Info:   Plan Frequency: 2x a week until goals met  Certification Period Expiration Date: 23  Total # of Visits Approved: 12  Total # of Visits to Date: 2        OUTPATIENT OCCUPATIONAL THERAPY: Treatment Note 2023  Episode: (BLE lymphedema)   Appt Desk      Treatment Diagnosis:  I89.022 Lymphedema with obesity  _  _  R53.83 Other fatigue  Medical/Referring Diagnosis:  Lymphedema, not elsewhere classified [I89.0]  Referring Physician:  Rosa Coe MD MD Orders:  OT Eval and Treat   Date of Onset:  Onset Date: 20     Allergies:  Bee venom, Other, Barium iodide, and Bee pollen  Restrictions/Precautions:    No data recorded  No data recorded  Medications Last Reviewed:  2023     Interventions Planned: (Treatment may consist of any combination of the following)  Current Treatment Recommendations: Strengthening; ROM; Equipment evaluation, education, & procurement; Patient/Caregiver education & training; Safety education & training; Self-Care / ADL; Positioning; Manual Therapy:  MLD     Subjective Comments: \"They started to fall down and get itchy. I am not sure how much fluid came out. Initial:      0/10 Post Session:      0/10  Medications Last Reviewed:  2023  Updated Objective Findings:    Treatment   Manual Lymph Drainage: 30 minutes. BLE with legs elevated, patient sitting up in tx recliner.    Lymph Nodes:    Cervical Supraclavicular Axillary Abdominal Inguinal Popliteal Antecubital   RIGHT []     []     []     []     []     [x]     []       LEFT []     []     []     []     []     [x]     []         Anastamoses:   Axillo-axillary Inguino-inguinal Axillo-inguinal Inguino-axillary   ANTERIOR []     []     []     [] POSTERIOR []     []     []     []       RIGHT []     []     []     []       LEFT []     []     []     []         Limbs:   []    RUE     []    LUE     [x]    RLE    [x]    LLE    Self Care: (30 minutes): Eucerin used for skin care. Cotton Stockinet for BLE's, 3 short stretch bandages each leg. To remove if SOB or painful. To wear until morning of next visit and don biagrip after shower. Treatment/Session Summary:    Treatment Assessment: Did well wearing compression. Continue tx plan. Communication/Consultation:  None today  Equipment provided today:  None  Recommendations/Intent for next treatment session: Next visit will focus on continuation of CDT and MLD. Total Treatment Billable Duration: 60 minutes  OT Individual Minutes  Time In: 0900  Time Out: 1000  Minutes: [address] [place] [address], OT    Post Session Pain  Charge Capture   POC Link  Treatment Note Link  MD Guidelines  MyChart    Future Appointments   Date Time Provider Dulce Carpioisti   2/28/2023  9:00 AM Tammy Mejia MD [facility] AMB   3/2/2023  8:00 AM MCKAY Decker O   3/13/2023  9:00 AM ENRICO MobleyBanner   3/23/2023  9:00 AM Yudith Mccauley RD [facility]   3/27/2023  9:00 AM ENRICO Mobley O   3/28/2023  9:00 AM Yudith Mccauley RD [facility]   5/3/2023 11:00 AM BRETT Orr [facility] AMB   6/14/2023 10:15 AM Sharon Solis MD [facility] AM[facility]

## 2023-02-28 ENCOUNTER — OFFICE VISIT (OUTPATIENT)
Dept: INTERNAL MEDICINE CLINIC | Facility: CLINIC | Age: 33
End: 2023-02-28

## 2023-02-28 VITALS
HEIGHT: 66 IN | HEART RATE: 91 BPM | WEIGHT: 315 LBS | BODY MASS INDEX: 50.62 KG/M2 | DIASTOLIC BLOOD PRESSURE: 62 MMHG | SYSTOLIC BLOOD PRESSURE: 104 MMHG | OXYGEN SATURATION: 97 %

## 2023-02-28 DIAGNOSIS — Z00.00 ANNUAL PHYSICAL EXAM: Primary | ICD-10-CM

## 2023-02-28 DIAGNOSIS — E66.01 MORBID OBESITY (HCC): ICD-10-CM

## 2023-02-28 ASSESSMENT — ENCOUNTER SYMPTOMS
ABDOMINAL DISTENTION: 0
CONSTIPATION: 0
DIARRHEA: 0
COUGH: 0
SINUS PAIN: 0
CHOKING: 0
CHEST TIGHTNESS: 0
BACK PAIN: 0
BLOOD IN STOOL: 0
SORE THROAT: 0

## 2023-02-28 NOTE — PROGRESS NOTES
Chief Complaint   Patient presents with    Annual Exam     6 month f/u. Cris Aburto is a 33 y.o. male who presents today for Annual Exam (6 month f/u. )     He is here for routine annual exam, since last visit with me he was able to establish with a lymphedema clinic, currently attempting wrapping his legs, but they believe he may not need to, and to focusing lifestyle and dietary changes for weight loss. He is currently trying to follow recommendations by endocrine for management of his diabetes, unfortunately he continues to increase weight, and reports occasional dietary indiscretions, like eating [employer] for breakfast, but he tries to eat at home with grilled chicken, vegetables, and  beef  He is not currently exercising, he reports not having a routine. He normally wake up, helps taking care of the pets, and after he goes out with his family for appointments, and shoulders. He does not have routine of exercise, they used to have a pool but unfortunately is not currently functioning. And he depends in transportation to going to the gym or other activities. Has not been able to refill his Corlanor for at least 3 months, but he reports no changes in cardiomyopathy, or increasing shortness of breath, but to increase in weight. Up-to-date with eye examination, but due for dental exam,  His vaccines are currently up-to-date, but he may require pneumonia shots every 5 years which currently may need Prevnar 20    Wt Readings from Last 3 Encounters:   02/28/23 (!) 381 lb (172.8 kg)   02/22/23 (!) 374 lb (169.6 kg)   01/31/23 (!) 362 lb (164.2 kg)         Assessment  And plan    1. Annual physical exam  2.  Morbid obesity ([address]    Discussed Primary prevention aims to avert the development of disease including Immunizations, life style modifications (smoking cessation, promoting physical activity: 30 minutes x 3 week of moderate activity , diet changes ), Also discussed about Secondary prevention and in how it focuses on early detection and treatment of asymptomatic disease. Screening for cancer, hearing ,dental or vision impairment, and how failure to do so may result in disease and even dead     More than 50% of these 40 minutes visit was dedicated to discuss compliance with medications, lifestyle modification, trying to lose weight, motivation for engaging in meaningful activities,  He expressed his interest in getting his GED, and is starting to look into art classes. He also is planning to start walking more, we discussed options of videos online for exercise options    Patient was also advised to continue to follow-up recommendations by endocrine, nutritionist, and to reach out to cardiology if any symptoms change. And also to let them know that his medication have not been approved    Return in about 1 year (around 2/28/2024) for continue follow up with endocrin , cardiology  . This document was generated with the aid of voice recognition software. . Please be aware that there may be inadvertent transcription errors not identified and corrected by the author    Vitals:    02/28/23 0859   BP: 104/62   Site: Right Upper Arm   Position: Sitting   Pulse: 91   SpO2: 97%   Weight: (!) 381 lb (172.8 kg)   Height: 5' 6\" (1.676 m)          Current Outpatient Medications:     JARDIANCE 25 MG tablet, Take 1 tablet by mouth daily, Disp: 30 tablet, Rfl: 11    methIMAzole (TAPAZOLE) 5 MG tablet, Take 0.5 tablets by mouth daily, Disp: 45 tablet, Rfl: 3    Insulin Glargine-yfgn 100 UNIT/ML SOPN, Inject 75 Units into the skin 2 times daily, Disp: 45 mL, Rfl: 11    ADMELOG SOLOSTAR 100 UNIT/ML SOPN, 20 units AC supper plus correction scale 5/50>150 AC/HS (up to 50 units daily), Disp: 15 mL, Rfl: 11    carvedilol (COREG) 25 MG tablet, Take 1 tablet by mouth 2 times daily, Disp: 60 tablet, Rfl: 11    OZEMPIC, 2 MG/DOSE, 8 MG/3ML SOPN, Inject 2 mg into the skin every 7 days, Disp: 3 mL, Rfl: 11 fenofibrate (TRICOR) 145 MG tablet, Take 1 tablet by mouth daily, Disp: 90 tablet, Rfl: 3    furosemide (LASIX) 20 MG tablet, Take 1 tablet by mouth daily, Disp: 90 tablet, Rfl: 3    lisinopril (PRINIVIL;ZESTRIL) 40 MG tablet, Take 0.5 tablets by mouth daily, Disp: 90 tablet, Rfl: 3    rosuvastatin (CRESTOR) 40 MG tablet, Take 1 tablet by mouth every evening, Disp: 90 tablet, Rfl: 3    spironolactone (ALDACTONE) 25 MG tablet, Take 1 tablet by mouth daily, Disp: 90 tablet, Rfl: 3    Lancets (ONETOUCH DELICA PLUS [id number]) MISC, USE TO TEST 3 TIMES DAILY, Disp: , Rfl:     ONETOUCH VERIO strip, Use to check blood glucose four times daily E11.65, Disp: 400 each, Rfl: 3    Blood Glucose Monitoring Suppl (ONETOUCH VERIO) w/Device KIT, Use to check blood glucose four times daily E11.65, Disp: 1 kit, Rfl: 0    Lancets (ONETOUCH DELICA PLUS [id number]) [facility], Use to check blood glucose four times daily E11.65, Disp: 400 each, Rfl: 3    ACCU-CHEK GUIDE strip, Check blood glucose three times daily. Dx E11.65, Disp: 300 each, Rfl: 3    Insulin Pen Needle 32G X 6 MM MISC, Use with insulin up to 4 times daily, E11.65, Disp: 400 each, Rfl: 3    Cholecalciferol (D3 HIGH POTENCY) 50 MCG (2000 UT) CAPS, Take 1 capsule by mouth daily, Disp: 90 capsule, Rfl: 3    CALCIUM PO, Take by mouth, Disp: , Rfl:     cyanocobalamin 1000 MCG tablet, Take 1,000 mcg by mouth, Disp: , Rfl:     Sod Fluoride-Potassium Nitrate 1.1-5 % PSTE, BRUSH TEETH FOR 2 MINUTES TWICE A DAY. EXPECTORATE.  DO NOT EAT OR DINK AFTER BRUSHING, Disp: , Rfl:     ivabradine (CORLANOR) 5 MG TABS tablet, Take 1 tablet by mouth 2 times daily (with meals) (Patient not taking: No sig reported), Disp: 60 tablet, Rfl: 11    Icosapent Ethyl (VASCEPA) 1 g CAPS capsule, Take 2 capsules by mouth 2 times daily (with meals) (Patient not taking: No sig reported), Disp: 180 capsule, Rfl: 3    Family History   Problem Relation Age of Onset    Diabetes Paternal Grandfather     Diabetes Father     Heart Disease Paternal Grandfather     Hypertension Paternal Grandmother     Heart Disease Paternal Grandmother     Hypertension Maternal Grandfather     Heart Disease Maternal Grandfather     Hypertension Maternal Grandmother     Heart Disease Maternal Grandmother     Stroke Mother     Hypertension Mother     Diabetes Mother     Heart Disease Father     Hypertension Father     Hypertension Paternal Grandfather         Past Medical History:   Diagnosis Date    Anasarca     Congestive heart failure ([facility] [address]     sees Dr Marielle Hernandez    Hypertension     Lazy eye of right side     had since infancy    Mixed hyperlipidemia     Morbid obesity ([facility] [address]     NAFLD (nonalcoholic fatty liver disease)     MARK (obstructive sleep apnea)     Type 2 diabetes mellitus ([facility] [address]         Past Surgical History:   Procedure Laterality Date    GASTRIC BYPASS SURGERY  12/26/2018    gastric     TONSILLECTOMY          No flowsheet data found.      IMMUNIZATIONS  Immunization History   Administered Date(s) Administered    COVID-19, PFIZER PURPLE top, DILUTE for use, (age 15 y+), 30mcg/0.3mL 03/19/2021, 04/11/2021, 11/23/2021    DTP 01/03/1992, 03/06/1992, 05/12/1992, 06/11/1993    Hepatitis A Adult (Havrix, Vaqta) 09/01/2018, 03/01/2019    Hepatitis A Vaccine 09/01/2018, 03/01/2019    Hepatitis B 09/01/2018, 10/01/2018, 03/01/2019    Hepatitis B vaccine 09/01/2018, 10/01/2018, 03/01/2019    Hib vaccine 01/06/1992, 03/06/1992, 05/12/1992, 06/11/1993    Influenza Virus Vaccine 09/01/2018, 09/26/2019, 08/31/2020, 09/13/2021    Influenza, FLUARIX, FLULAVAL, Christy Horseman (age 10 mo+) AND AFLURIA, (age 1 y+), PF, 0.5mL 09/01/2018, 09/26/2019, 08/31/2020    Influenza, FLUCELVAX, (age 10 mo+), MDCK, PF, 0.5mL 08/29/2017    MMR 06/11/1993    Pneumococcal Polysaccharide (Bprkoeulf19) 04/14/2016    Polio OPV 01/03/1992, 03/06/1992, 06/11/1993    Tdap (Boostrix, Adacel) 09/01/2018            Health Maintenance Due   Topic Date Due    Varicella vaccine (1 of 2 - 2-dose childhood series) Never done    COVID-19 Vaccine (4 - Booster for Pfizer series) 01/18/2022    Flu vaccine (1) 08/01/2022            /62 (Site: Right Upper Arm, Position: Sitting)   Pulse 91   Ht 5' 6\" (1.676 m)   Wt (!) 381 lb (172.8 kg)   SpO2 97%   BMI 61.50 kg/m²     Review of Systems   Constitutional:  Negative for activity change, appetite change, chills, fatigue, fever and unexpected weight change. HENT:  Negative for congestion, postnasal drip, sinus pain and sore throat. Respiratory:  Negative for cough, choking and chest tightness. Cardiovascular:  Negative for chest pain, palpitations and leg swelling. Gastrointestinal:  Negative for abdominal distention, blood in stool, constipation and diarrhea. Endocrine: Negative for polydipsia, polyphagia and polyuria. Genitourinary:  Negative for difficulty urinating, dysuria and hematuria. Musculoskeletal:  Negative for arthralgias, back pain, myalgias and neck pain. Skin:  Negative for rash. Neurological:  Negative for dizziness and headaches. Psychiatric/Behavioral:  Negative for sleep disturbance and suicidal ideas. The patient is not nervous/anxious. Physical Exam  Vitals reviewed. Constitutional:       Appearance: Normal appearance. He is obese. HENT:      Head: Normocephalic and atraumatic. Right Ear: Tympanic membrane normal. There is no impacted cerumen. Left Ear: Tympanic membrane normal. There is no impacted cerumen. Ears:      Comments: Erythemic Ear canal , normal TM     Nose: Nose normal.      Mouth/Throat:      Mouth: Mucous membranes are moist.   Eyes:      Extraocular Movements: Extraocular movements intact. Pupils: Pupils are equal, round, and reactive to light. Cardiovascular:      Rate and Rhythm: Normal rate and regular rhythm. Pulses: Normal pulses. Pulmonary:      Effort: Pulmonary effort is normal.      Breath sounds: Normal breath sounds.    Abdominal: Palpations: Abdomen is soft. Musculoskeletal:         General: Normal range of motion. Cervical back: Normal range of motion. Right lower leg: Right lower leg edema: BL LE wrapped. Skin:     General: Skin is warm. Neurological:      General: No focal deficit present. Mental Status: He is alert and oriented to person, place, and time.    Psychiatric:         Mood and Affect: Mood normal.         Behavior: Behavior normal.

## 2023-03-02 ENCOUNTER — HOSPITAL ENCOUNTER (OUTPATIENT)
Dept: PHYSICAL THERAPY | Age: 33
Setting detail: RECURRING SERIES
Discharge: HOME | End: 2023-03-05
Payer: COMMERCIAL

## 2023-03-02 PROCEDURE — 97535 SELF CARE MNGMENT TRAINING: CPT

## 2023-03-02 PROCEDURE — 97140 MANUAL THERAPY 1/> REGIONS: CPT

## 2023-03-02 NOTE — PROGRESS NOTES
Radha Amor  : 1990  Primary: [address] (Medicaid Managed)  Secondary:  [address] @ [facility]  [address] [place] [address]  Phone: [phone]  Fax: [phone]    OT Visit Info:   Plan Frequency: 2x a week until goals met  Certification Period Expiration Date: 23  Total # of Visits Approved: 12  Total # of Visits to Date: 3        OUTPATIENT OCCUPATIONAL THERAPY: Treatment Note 3/2/2023  Episode: (BLE lymphedema)   Appt Desk      Treatment Diagnosis:  I89.022 Lymphedema with obesity  _  _  R53.83 Other fatigue  Medical/Referring Diagnosis:  Lymphedema, not elsewhere classified [I89.0]  Referring Physician:  Cosme Hudson MD MD Orders:  OT Eval and Treat   Date of Onset:  Onset Date: 20     Allergies:  Bee venom, Other, Barium iodide, and Bee pollen  Restrictions/Precautions:    No data recorded  No data recorded  Medications Last Reviewed:  3/2/2023     Interventions Planned: (Treatment may consist of any combination of the following)  Current Treatment Recommendations: Strengthening; ROM; Equipment evaluation, education, & procurement; Patient/Caregiver education & training; Safety education & training; Self-Care / ADL; Positioning; Manual Therapy:  MLD     Subjective Comments: \"They fell off again after a day or two. I think my legs are good now. \"      Initial:      0/10 Post Session:      0/10  Medications Last Reviewed:  3/2/2023  Updated Objective Findings:    Treatment   Manual Lymph Drainage: 45 minutes. BLE with legs elevated, patient sitting up in tx recliner.    Lymph Nodes:    Cervical Supraclavicular Axillary Abdominal Inguinal Popliteal Antecubital   RIGHT []     []     []     []     []     [x]     []       LEFT []     []     []     []     []     [x]     []         Anastamoses:   Axillo-axillary Inguino-inguinal Axillo-inguinal Inguino-axillary   ANTERIOR []     []     []     []       POSTERIOR []     [] []     []       RIGHT []     []     []     []       LEFT []     []     []     []         Limbs:   []    RUE     []    LUE     [x]    RLE    [x]    LLE    Self Care: (15 minutes): Eucerin used for skin care. Hygiene due to visible dirt. Education for compression socks to order. Treatment/Session Summary:    Treatment Assessment: Schedule more visits only if problems arise over the next two weeks. Communication/Consultation:  None today  Equipment provided today:  None  Recommendations/Intent for next treatment session: Next visit will focus on continuation of CDT and MLD. Total Treatment Billable Duration: 60 minutes  OT Individual Minutes  Time In: 0800  Time Out: 0900  Minutes: [address] [place] [address]    Post Session Pain  Charge Capture   POC Link  Treatment Note Link  MD Guidelines  MyChart    Future Appointments   Date Time Provider Dulce Frey   3/13/2023  9:00 AM ENRICO Basilio [facility]   3/23/2023  9:00 AM Bradley Huerta RD [facility]   3/27/2023  9:00 AM ENRICO Basilio [facility]   3/28/2023  9:00 AM Bradley Huerta RD [facility]   5/3/2023 11:00 AM BRETT Crowell [facility]   2023 10:15 AM MD JOSE ALBERTO Ford [facility]   2024  9:40 AM Nilesh Ariza MD [facility]

## 2023-03-09 NOTE — TELEPHONE ENCOUNTER
----- Message from Palma Rahman MD sent at 2/22/2023 12:29 PM EST -----  Can we try to get this medication authorized again. Patient has been on Corlanor for a long time and it gives us very good results  ----- Message -----  From: Irma Emery MD  Sent: 2/22/2023  12:10 PM EST  To: Palma Rahman MD    Good afternoon  Marily Owens was here today to request a referral he expressed concerns about the coverage of Corlanor  , he has been told has to pay 500,   The [facility] pharmacist keep telling him his insurance is not covering the medication, but he is under the impression that his insurance has approved it.   I was wondering if you or your staff can look into his options  Thank you  Kamron Leos MD

## 2023-03-13 ENCOUNTER — HOSPITAL ENCOUNTER (OUTPATIENT)
Dept: DIABETES SERVICES | Age: 33
Setting detail: RECURRING SERIES
Discharge: HOME | End: 2023-03-16
Payer: COMMERCIAL

## 2023-03-13 PROCEDURE — G0109 DIAB MANAGE TRN IND/GROUP: HCPCS

## 2023-03-13 NOTE — PROGRESS NOTES
Participant attended Diabetes #1 session today, titled Understanding Diabetes: Topics included: Pathophysiology of Diabetes, Complications, Sleep Apnea, Diagnostic for diagnosis of Diabetes, A1C, Blood sugar target ranges, Glucometer use, Importance of blood sugar logs, Continuous Glucose Monitors, Sick day management, Hyperglycemia, Diabetic Ketoacidosis, Hypoglycemia, Emergency treatment of low blood sugars, And reviewed Diabetes Care team.  - Verbalized understanding of material covered.  -Anticipated adherence is good   -Problems/barriers may be: none anticipated. Personal   Mom did not come with him to education today  Activity is walking his dogs. Medication Reconciliation Completed. No surgery or procedure.

## 2023-03-23 ENCOUNTER — HOSPITAL ENCOUNTER (OUTPATIENT)
Dept: DIABETES SERVICES | Age: 33
Setting detail: RECURRING SERIES
Discharge: HOME | End: 2023-03-26
Payer: COMMERCIAL

## 2023-03-23 PROCEDURE — G0109 DIAB MANAGE TRN IND/GROUP: HCPCS

## 2023-03-23 NOTE — PROGRESS NOTES
Participant attended nutrition one session for clients with type 2 diabetes today titled understanding how you eat and how it impacts your diabetes. Topics included: how weight impacts diabetes, understanding basic food groups including carbohydrates, proteins, fats and non-starchy vegetables, understanding healthier food choices and tips for making healthy changes to your diet, fiber, sugar, eating out, restaurant beverages, impact of fluids you drink on blood sugars, alcoholic beverages, non-nutritive sweeteners, plate method for meal planning/portion control, diabetes/nutrition/fitness apps and recipe modification tips. Written booklet provided. -Verbalized understanding of material covered.  -Anticipated adherence is good. -Problems/barriers may be: Pt had gastric sleeve. Provided pt with verbal and written info on diet guidelines past gastric sleeve emphasizing to eat protein foods first and avoid drinking liquids with meals. No medication changes or surgery or procedures since last visit.

## 2023-03-27 ENCOUNTER — HOSPITAL ENCOUNTER (OUTPATIENT)
Dept: DIABETES SERVICES | Age: 33
Setting detail: RECURRING SERIES
Discharge: HOME | End: 2023-03-30
Payer: COMMERCIAL

## 2023-03-27 PROCEDURE — G0109 DIAB MANAGE TRN IND/GROUP: HCPCS

## 2023-03-27 NOTE — PROGRESS NOTES
Participant attended Diabetes #2 session today, titled Managing Diabetes: Topics include:  Controlling your Diabetes, Physical activity, Barriers to activity, Medications (oral and injectable, cost and mediation fatigue, Injectable medications including insulin and GLP-1 injectables, Insulin pens, Use of insulin syringe with vials, Drawing up insulin, Injections, Site location for injections,   rotation, Diabetes Distress, Stress, Depression, Diabetic foot care, Recommended vaccines, Diabetes Emergency Plan, Problem solving, and Goal setting. Participant's goal/support plan includes:        Diabetes Goal: To lower blood sugars, I will exercise 1-3 times a week for 5-15 minutes starting tomorrow. I will reevaluate weekly and increase as tolerated. Diabetes Support Plan: Refer to Diabetes Education Material.         Problems/barriers may be:  [address] for follow up/Recommendations:  Nutrition Two             Medication Reconciliation Completed.  No surgery or procedure

## 2023-03-28 ENCOUNTER — HOSPITAL ENCOUNTER (OUTPATIENT)
Dept: DIABETES SERVICES | Age: 33
Setting detail: RECURRING SERIES
Discharge: HOME | End: 2023-03-31
Payer: COMMERCIAL

## 2023-03-28 PROCEDURE — G0109 DIAB MANAGE TRN IND/GROUP: HCPCS

## 2023-03-28 NOTE — PROGRESS NOTES
Participant attended nutrition 2 session today for clients with type 2 diabetes titled Understanding carbohydrate counting. Topics included: Review of what will raise blood sugars, type of carbohydrates, what is carbohydrate counting, understanding how many carbohydrates are in food, carbohydrate counting using a guide book, meal planning using carbohydrate counting, reading food labels, practice with reading food labels, understanding fats, sodium, barriers to healthy lifestyle changes, snack ideas, tips for snacking, special diets, grocery shopping tips, herbs and spices, recipe modification, resources and goal setting. Participant's goal/support plan:  Diabetes goal: To keep blood sugars in target ranges he will eat a protein with every meal and snack and re-evaluate in 6 weeks. Diabetes support plan:Use the materials provided in class. Problems/barriers may be: none per pt  Plan for follow up: Has phone number for f/u questions. No medication changes since last visit. No new surgery or procedures since last visit. 2 = assistive person

## 2023-05-03 ENCOUNTER — TELEPHONE (OUTPATIENT)
Dept: CARDIOLOGY CLINIC | Age: 33
End: 2023-05-03

## 2023-05-03 ENCOUNTER — OFFICE VISIT (OUTPATIENT)
Dept: ENDOCRINOLOGY | Age: 33
End: 2023-05-03
Payer: COMMERCIAL

## 2023-05-03 VITALS
HEART RATE: 105 BPM | OXYGEN SATURATION: 97 % | BODY MASS INDEX: 59.4 KG/M2 | WEIGHT: 315 LBS | DIASTOLIC BLOOD PRESSURE: 82 MMHG | SYSTOLIC BLOOD PRESSURE: 124 MMHG

## 2023-05-03 DIAGNOSIS — E11.65 UNCONTROLLED TYPE 2 DIABETES MELLITUS WITH HYPERGLYCEMIA (HCC): Primary | ICD-10-CM

## 2023-05-03 DIAGNOSIS — E05.90 SUBCLINICAL HYPERTHYROIDISM: ICD-10-CM

## 2023-05-03 DIAGNOSIS — I10 ESSENTIAL HYPERTENSION: ICD-10-CM

## 2023-05-03 DIAGNOSIS — I50.22 CHRONIC SYSTOLIC (CONGESTIVE) HEART FAILURE (HCC): ICD-10-CM

## 2023-05-03 LAB — HBA1C MFR BLD: 8.2 %

## 2023-05-03 PROCEDURE — 83036 HEMOGLOBIN GLYCOSYLATED A1C: CPT | Performed by: PHYSICIAN ASSISTANT

## 2023-05-03 PROCEDURE — 3074F SYST BP LT 130 MM HG: CPT | Performed by: PHYSICIAN ASSISTANT

## 2023-05-03 PROCEDURE — 99214 OFFICE O/P EST MOD 30 MIN: CPT | Performed by: PHYSICIAN ASSISTANT

## 2023-05-03 PROCEDURE — 3046F HEMOGLOBIN A1C LEVEL >9.0%: CPT | Performed by: PHYSICIAN ASSISTANT

## 2023-05-03 PROCEDURE — 3079F DIAST BP 80-89 MM HG: CPT | Performed by: PHYSICIAN ASSISTANT

## 2023-05-03 RX ORDER — TIRZEPATIDE 5 MG/.5ML
5 INJECTION, SOLUTION SUBCUTANEOUS WEEKLY
Qty: 2 ML | Refills: 1 | Status: SHIPPED | OUTPATIENT
Start: 2023-05-03

## 2023-05-03 NOTE — PROGRESS NOTES
[facility]    César Cunningham PA-C  [facility]  [address], [address]  Jacki Arzola  Phone [phone]  Facsimile [phone]          Aldo Buckley is a 33 y.o. male seen 5/3/2023 for follow up evaluation of type 2 diabetes with CHF        Assessment and Plan:        1. Uncontrolled type 2 diabetes mellitus with hyperglycemia (HCC)  Glycemic control is improving but suboptimal.  Patient is tolerating treatment without side effects. Patient has been unable to obtain concentrated basal insulin allowing for once daily dosing but he is striving to improve compliance with twice daily dosing of his high-dose basal insulin    Lifestyle changes were applauded and encouraged    Patient has tolerated 2 mg of Ozempic with suboptimal glycemic control, BMI nearly 60. I contend he would benefit from twincretin therapy, changed weekly GLP-1 from Ozempic 2 mg to [facility], start a 5 mg dose. Patient was instructed regarding the common side effect of GI upset and early satiety and the less common associations of pancreatitis and C cell tumors. At today's visit we discussed sequela associated with uncontrolled diabetes including increased risk of stroke, heart attack, kidney failure, amputation, retinopathy, neuropathy, and nephropathy. Patient was strongly encouraged to comply with treatment regimen as well as dietary and exercise recommendations to aid in control of this chronic disease to help prevent complications associated with uncontrolled diabetes. Foot care again reviewed. The importance of avoiding walking barefoot emphasized. Risk of diabetic foot wound and subsequent amputation discussed      - AMB POC HEMOGLOBIN A1C  - MOUNJARO 5 MG/0.5ML SOPN SC injection; Inject 0.5 mLs into the skin once a week E11.65  Dispense: 2 mL; Refill: 1  - Comprehensive Metabolic Panel;  Future  - CBC with Auto

## 2023-05-03 NOTE — TELEPHONE ENCOUNTER
Pt came into the office stating that [facility] is needing a PA for [facility]

## 2023-07-18 ENCOUNTER — OFFICE VISIT (OUTPATIENT)
Dept: INTERNAL MEDICINE CLINIC | Facility: CLINIC | Age: 33
End: 2023-07-18
Payer: COMMERCIAL

## 2023-07-18 VITALS
SYSTOLIC BLOOD PRESSURE: 106 MMHG | BODY MASS INDEX: 50.62 KG/M2 | HEART RATE: 112 BPM | WEIGHT: 315 LBS | OXYGEN SATURATION: 99 % | HEIGHT: 66 IN | DIASTOLIC BLOOD PRESSURE: 64 MMHG

## 2023-07-18 DIAGNOSIS — K12.30 MUCOSITIS ORAL: Primary | ICD-10-CM

## 2023-07-18 PROCEDURE — 99214 OFFICE O/P EST MOD 30 MIN: CPT | Performed by: INTERNAL MEDICINE

## 2023-07-18 PROCEDURE — 3074F SYST BP LT 130 MM HG: CPT | Performed by: INTERNAL MEDICINE

## 2023-07-18 PROCEDURE — 3078F DIAST BP <80 MM HG: CPT | Performed by: INTERNAL MEDICINE

## 2023-07-18 ASSESSMENT — ENCOUNTER SYMPTOMS
TROUBLE SWALLOWING: 0
SORE THROAT: 1

## 2023-07-18 NOTE — PROGRESS NOTES
Chief Complaint   Patient presents with    Sudha Cerda is a 33 y.o. male who presents today for [facility]     He is here concerned about possible thrush, has been trying over-the-counter products, rinses, yogurt, but continues to experience swelling of the lips, and pain, he denies any blisters, but white patches  Symptoms started few weeks ago    Wt Readings from Last 3 Encounters:   07/18/23 (!) 363 lb 12.8 oz (165 kg)   06/14/23 (!) 369 lb (167.4 kg)   05/03/23 (!) 368 lb (166.9 kg)     Vitals:    07/18/23 1449   BP: 106/64   Site: Right Upper Arm   Position: Sitting   Pulse: (!) 112   SpO2: 99%   Weight: (!) 363 lb 12.8 oz (165 kg)   Height: 5' 6\" (1.676 m)        Assessment and plan:  1. Mucositis oral  -     Magic Mouthwash (MIRACLE MOUTHWASH); Swish and spit 5 mLs 4 times daily as needed for Irritation 100 ml diphenhydramine 100 ml nystatin 100 ml lidocaine, Disp-300 mL, R-1Normal  Possible thrush, we discussed about other possible causes of mucositis or gingivitis, patient reports being up-to-date with dental care, will try nystatin, with lidocaine and antihistaminics, if not improvement, we will consider other antifungal medications like clotrimazole troches    Return if symptoms worsen or fail to improve. Review of system:    Review of Systems   Constitutional:  Negative for activity change and appetite change. HENT:  Positive for sore throat. Negative for trouble swallowing.         Immunization history:    Immunization History   Administered Date(s) Administered    COVID-19, PFIZER PURPLE top, DILUTE for use, (age 15 y+), 30mcg/0.3mL 03/19/2021, 04/11/2021, 11/23/2021    DTP 01/03/1992, 03/06/1992, 05/12/1992, 06/11/1993    Hep A, HAVRIX, VAQTA, (age 19y+), IM, 1mL 09/01/2018, 03/01/2019    Hepatitis A Vaccine 09/01/2018, 03/01/2019    Hepatitis B 09/01/2018, 10/01/2018, 03/01/2019    Hepatitis B vaccine 09/01/2018, 10/01/2018, 03/01/2019    Hib vaccine 01/06/1992,

## 2023-09-13 ENCOUNTER — OFFICE VISIT (OUTPATIENT)
Dept: ENDOCRINOLOGY | Age: 33
End: 2023-09-13
Payer: COMMERCIAL

## 2023-09-13 VITALS
WEIGHT: 315 LBS | HEART RATE: 94 BPM | DIASTOLIC BLOOD PRESSURE: 55 MMHG | BODY MASS INDEX: 56.69 KG/M2 | SYSTOLIC BLOOD PRESSURE: 120 MMHG | OXYGEN SATURATION: 98 %

## 2023-09-13 DIAGNOSIS — I10 ESSENTIAL HYPERTENSION: ICD-10-CM

## 2023-09-13 DIAGNOSIS — E11.65 UNCONTROLLED TYPE 2 DIABETES MELLITUS WITH HYPERGLYCEMIA (HCC): Primary | ICD-10-CM

## 2023-09-13 DIAGNOSIS — Z71.3 DIETARY COUNSELING: ICD-10-CM

## 2023-09-13 DIAGNOSIS — E05.90 SUBCLINICAL HYPERTHYROIDISM: ICD-10-CM

## 2023-09-13 DIAGNOSIS — I50.22 CHRONIC SYSTOLIC (CONGESTIVE) HEART FAILURE (HCC): ICD-10-CM

## 2023-09-13 DIAGNOSIS — E78.2 MIXED HYPERLIPIDEMIA: ICD-10-CM

## 2023-09-13 DIAGNOSIS — Z71.89 CARDIAC RISK COUNSELING: ICD-10-CM

## 2023-09-13 LAB — HBA1C MFR BLD: 9.3 %

## 2023-09-13 PROCEDURE — 3074F SYST BP LT 130 MM HG: CPT | Performed by: PHYSICIAN ASSISTANT

## 2023-09-13 PROCEDURE — 83036 HEMOGLOBIN GLYCOSYLATED A1C: CPT | Performed by: PHYSICIAN ASSISTANT

## 2023-09-13 PROCEDURE — 3078F DIAST BP <80 MM HG: CPT | Performed by: PHYSICIAN ASSISTANT

## 2023-09-13 PROCEDURE — 99214 OFFICE O/P EST MOD 30 MIN: CPT | Performed by: PHYSICIAN ASSISTANT

## 2023-09-13 PROCEDURE — 3046F HEMOGLOBIN A1C LEVEL >9.0%: CPT | Performed by: PHYSICIAN ASSISTANT

## 2023-09-13 RX ORDER — BLOOD SUGAR DIAGNOSTIC
STRIP MISCELLANEOUS
Qty: 400 EACH | Refills: 3 | Status: SHIPPED | OUTPATIENT
Start: 2023-09-13 | End: 2023-09-13 | Stop reason: SDUPTHER

## 2023-09-13 RX ORDER — TIRZEPATIDE 5 MG/.5ML
5 INJECTION, SOLUTION SUBCUTANEOUS WEEKLY
Qty: 2 ML | Refills: 1 | Status: SHIPPED | OUTPATIENT
Start: 2023-09-13

## 2023-09-13 RX ORDER — BLOOD SUGAR DIAGNOSTIC
STRIP MISCELLANEOUS
Qty: 400 EACH | Refills: 3 | Status: SHIPPED | OUTPATIENT
Start: 2023-09-13 | End: 2023-09-14 | Stop reason: CLARIF

## 2023-09-13 RX ORDER — TIRZEPATIDE 5 MG/.5ML
5 INJECTION, SOLUTION SUBCUTANEOUS WEEKLY
Qty: 2 ML | Refills: 1 | Status: SHIPPED | OUTPATIENT
Start: 2023-09-13 | End: 2023-09-13 | Stop reason: SDUPTHER

## 2023-09-13 NOTE — PROGRESS NOTES
[facility]   AND   [facility]    Winston Waterman PA-C  [facility]  [address], [address], [address] [place] [address] Rd,[address]  Phone [phone]  Facsimile [phone]          Mindy Beckford is a 33 y.o. male seen 9/13/2023 for follow up evaluation of type 2 diabetes with CHF        Assessment and Plan:        1. Uncontrolled type 2 diabetes mellitus with hyperglycemia (HCC)   Glycemic control is improving but suboptimal.  Patient is tolerating treatment without side effects. Patient has been unable to obtain concentrated basal insulin allowing for once daily dosing but he is striving to improve compliance with twice daily dosing of his high-dose basal insulin    Lifestyle changes were applauded and encouraged, weight loss applauded and encouraged     Patient has tolerated 2 mg of Ozempic with suboptimal glycemic control, BMI >55. I contend he would benefit from twincretin therapy, changed weekly GLP-1 from Ozempic 2 mg to [facility], start a 5 mg dose. Patient was instructed regarding the common side effect of GI upset and early satiety and the less common associations of pancreatitis and C cell tumors. At today's visit we discussed sequela associated with uncontrolled diabetes including increased risk of stroke, heart attack, kidney failure, amputation, retinopathy, neuropathy, and nephropathy. Patient was strongly encouraged to comply with treatment regimen as well as dietary and exercise recommendations to aid in control of this chronic disease to help prevent complications associated with uncontrolled diabetes. Foot care again reviewed. The importance of avoiding walking barefoot emphasized, changes to personal foot hygiene commended. Risk of diabetic foot wound and subsequent amputation discussed      - AMB POC HEMOGLOBIN A1C  - MOUNJARO 5 MG/0.5ML SOPN SC injection;  Inject 0.5 mLs into the skin once a week E11.65  Dispense: 2

## 2023-09-14 ENCOUNTER — TELEPHONE (OUTPATIENT)
Dept: ENDOCRINOLOGY | Age: 33
End: 2023-09-14

## 2023-09-14 DIAGNOSIS — E11.65 UNCONTROLLED TYPE 2 DIABETES MELLITUS WITH HYPERGLYCEMIA (HCC): Primary | ICD-10-CM

## 2023-09-14 RX ORDER — BLOOD SUGAR DIAGNOSTIC
STRIP MISCELLANEOUS
Qty: 400 EACH | Refills: 3 | Status: SHIPPED | OUTPATIENT
Start: 2023-09-14

## 2023-09-14 RX ORDER — BLOOD-GLUCOSE METER
EACH MISCELLANEOUS
Qty: 1 KIT | Refills: 1 | Status: SHIPPED | OUTPATIENT
Start: 2023-09-14

## 2023-09-14 RX ORDER — LANCETS
EACH MISCELLANEOUS
Qty: 102 EACH | Refills: 3 | Status: SHIPPED | OUTPATIENT
Start: 2023-09-14

## 2023-09-14 RX ORDER — LANCING DEVICE/LANCETS
KIT MISCELLANEOUS
Qty: 1 KIT | Refills: 1 | Status: SHIPPED | OUTPATIENT
Start: 2023-09-14

## 2023-09-14 NOTE — TELEPHONE ENCOUNTER
Pt left message, called and spoke to the Pt's insurance needs a PA for his mounjaro and will not pay for the one touch and he needs a Accu Chec guide machine [facility] [address] in [place]. Added the Mounjaro to the PA excel spread sheet.

## 2023-09-20 ENCOUNTER — TELEPHONE (OUTPATIENT)
Dept: ENDOCRINOLOGY | Age: 33
End: 2023-09-20

## 2023-10-09 NOTE — TELEPHONE ENCOUNTER
Spoke with medicaid and found out they never received his PA. I did one verbally over phone today. [id number]. PA dep J#[phone].

## 2023-10-13 NOTE — TELEPHONE ENCOUNTER
PA bertrand Melchor has been approved from 10/9/2023-10/9/2024.    [id number]

## 2023-12-07 ENCOUNTER — OFFICE VISIT (OUTPATIENT)
Age: 33
End: 2023-12-07
Payer: COMMERCIAL

## 2023-12-07 VITALS
BODY MASS INDEX: 50.62 KG/M2 | HEIGHT: 66 IN | HEART RATE: 90 BPM | DIASTOLIC BLOOD PRESSURE: 76 MMHG | SYSTOLIC BLOOD PRESSURE: 128 MMHG | WEIGHT: 315 LBS

## 2023-12-07 DIAGNOSIS — I50.22 CHRONIC SYSTOLIC (CONGESTIVE) HEART FAILURE (HCC): ICD-10-CM

## 2023-12-07 DIAGNOSIS — E11.9 TYPE 2 DIABETES MELLITUS WITHOUT COMPLICATION, WITHOUT LONG-TERM CURRENT USE OF INSULIN (HCC): ICD-10-CM

## 2023-12-07 DIAGNOSIS — I10 ESSENTIAL HYPERTENSION: Primary | ICD-10-CM

## 2023-12-07 DIAGNOSIS — E78.2 MIXED HYPERLIPIDEMIA: ICD-10-CM

## 2023-12-07 PROCEDURE — 93000 ELECTROCARDIOGRAM COMPLETE: CPT | Performed by: INTERNAL MEDICINE

## 2023-12-07 PROCEDURE — 99214 OFFICE O/P EST MOD 30 MIN: CPT | Performed by: INTERNAL MEDICINE

## 2023-12-07 PROCEDURE — 3078F DIAST BP <80 MM HG: CPT | Performed by: INTERNAL MEDICINE

## 2023-12-07 PROCEDURE — 3074F SYST BP LT 130 MM HG: CPT | Performed by: INTERNAL MEDICINE

## 2023-12-07 PROCEDURE — 3046F HEMOGLOBIN A1C LEVEL >9.0%: CPT | Performed by: INTERNAL MEDICINE

## 2023-12-07 RX ORDER — CARVEDILOL 25 MG/1
25 TABLET ORAL 2 TIMES DAILY
Qty: 60 TABLET | Refills: 11 | Status: SHIPPED | OUTPATIENT
Start: 2023-12-07

## 2023-12-07 RX ORDER — SPIRONOLACTONE 25 MG/1
25 TABLET ORAL DAILY
Qty: 90 TABLET | Refills: 3 | Status: SHIPPED | OUTPATIENT
Start: 2023-12-07

## 2023-12-07 RX ORDER — FENOFIBRATE 145 MG/1
145 TABLET, COATED ORAL DAILY
Qty: 90 TABLET | Refills: 3 | Status: SHIPPED | OUTPATIENT
Start: 2023-12-07

## 2023-12-07 RX ORDER — LISINOPRIL 40 MG/1
20 TABLET ORAL DAILY
Qty: 90 TABLET | Refills: 3 | Status: SHIPPED | OUTPATIENT
Start: 2023-12-07

## 2023-12-07 RX ORDER — FUROSEMIDE 20 MG/1
20 TABLET ORAL DAILY
Qty: 90 TABLET | Refills: 3 | Status: SHIPPED | OUTPATIENT
Start: 2023-12-07

## 2023-12-07 RX ORDER — ROSUVASTATIN CALCIUM 40 MG/1
40 TABLET, COATED ORAL EVERY EVENING
Qty: 90 TABLET | Refills: 3 | Status: SHIPPED | OUTPATIENT
Start: 2023-12-07

## 2023-12-07 NOTE — PROGRESS NOTES
appropriate specialist.    I have personally seen and evaluated the patient and reviewed the students note and agree with the following assessment and plan and findings. I was present for and observed the key components of this note. Any appropriate additions or editing of the information have been done by me.     Armand Molina MD, [facility]  Cardiology

## 2024-01-08 DIAGNOSIS — I10 ESSENTIAL HYPERTENSION: ICD-10-CM

## 2024-01-08 DIAGNOSIS — E05.90 SUBCLINICAL HYPERTHYROIDISM: ICD-10-CM

## 2024-01-08 DIAGNOSIS — E11.65 UNCONTROLLED TYPE 2 DIABETES MELLITUS WITH HYPERGLYCEMIA (HCC): ICD-10-CM

## 2024-01-08 LAB
ALBUMIN SERPL-MCNC: 3.6 G/DL (ref 3.5–5)
ALBUMIN/GLOB SERPL: 0.8 (ref 0.4–1.6)
ALP SERPL-CCNC: 98 U/L (ref 50–136)
ALT SERPL-CCNC: 60 U/L (ref 12–65)
ANION GAP SERPL CALC-SCNC: 6 MMOL/L (ref 2–11)
AST SERPL-CCNC: 24 U/L (ref 15–37)
BASOPHILS # BLD: 0.1 K/UL (ref 0–0.2)
BASOPHILS NFR BLD: 1 % (ref 0–2)
BILIRUB SERPL-MCNC: 0.9 MG/DL (ref 0.2–1.1)
BUN SERPL-MCNC: 17 MG/DL (ref 6–23)
CALCIUM SERPL-MCNC: 9.1 MG/DL (ref 8.3–10.4)
CHLORIDE SERPL-SCNC: 103 MMOL/L (ref 103–113)
CHOLEST SERPL-MCNC: 217 MG/DL
CO2 SERPL-SCNC: 29 MMOL/L (ref 21–32)
CREAT SERPL-MCNC: 0.9 MG/DL (ref 0.8–1.5)
DIFFERENTIAL METHOD BLD: ABNORMAL
EOSINOPHIL # BLD: 0.2 K/UL (ref 0–0.8)
EOSINOPHIL NFR BLD: 2 % (ref 0.5–7.8)
ERYTHROCYTE [DISTWIDTH] IN BLOOD BY AUTOMATED COUNT: 13.9 % (ref 11.9–14.6)
GLOBULIN SER CALC-MCNC: 4.3 G/DL (ref 2.8–4.5)
GLUCOSE SERPL-MCNC: 189 MG/DL (ref 65–100)
HCT VFR BLD AUTO: 48.7 % (ref 41.1–50.3)
HDLC SERPL-MCNC: 33 MG/DL (ref 40–60)
HDLC SERPL: 6.6
HGB BLD-MCNC: 16.5 G/DL (ref 13.6–17.2)
IMM GRANULOCYTES # BLD AUTO: 0.1 K/UL (ref 0–0.5)
IMM GRANULOCYTES NFR BLD AUTO: 1 % (ref 0–5)
LDLC SERPL CALC-MCNC: 112 MG/DL
LYMPHOCYTES # BLD: 2.8 K/UL (ref 0.5–4.6)
LYMPHOCYTES NFR BLD: 38 % (ref 13–44)
MCH RBC QN AUTO: 29 PG (ref 26.1–32.9)
MCHC RBC AUTO-ENTMCNC: 33.9 G/DL (ref 31.4–35)
MCV RBC AUTO: 85.7 FL (ref 82–102)
MONOCYTES # BLD: 0.5 K/UL (ref 0.1–1.3)
MONOCYTES NFR BLD: 7 % (ref 4–12)
NEUTS SEG # BLD: 3.9 K/UL (ref 1.7–8.2)
NEUTS SEG NFR BLD: 51 % (ref 43–78)
NRBC # BLD: 0 K/UL (ref 0–0.2)
PLATELET # BLD AUTO: 279 K/UL (ref 150–450)
PMV BLD AUTO: 11.1 FL (ref 9.4–12.3)
POTASSIUM SERPL-SCNC: 3.9 MMOL/L (ref 3.5–5.1)
PROT SERPL-MCNC: 7.9 G/DL (ref 6.3–8.2)
RBC # BLD AUTO: 5.68 M/UL (ref 4.23–5.6)
SODIUM SERPL-SCNC: 138 MMOL/L (ref 136–146)
T3 SERPL-MCNC: 1.04 NG/ML (ref 0.6–1.81)
T4 FREE SERPL-MCNC: 1.2 NG/DL (ref 0.78–1.46)
TRIGL SERPL-MCNC: 360 MG/DL (ref 35–150)
TSH, 3RD GENERATION: 0.45 UIU/ML (ref 0.36–3.74)
VLDLC SERPL CALC-MCNC: 72 MG/DL (ref 6–23)
WBC # BLD AUTO: 7.5 K/UL (ref 4.3–11.1)

## 2024-01-09 LAB
CREAT UR-MCNC: 49 MG/DL
EST. AVERAGE GLUCOSE BLD GHB EST-MCNC: 237 MG/DL
HBA1C MFR BLD: 9.9 % (ref 4.8–5.6)
MICROALBUMIN UR-MCNC: 3.01 MG/DL
MICROALBUMIN/CREAT UR-RTO: 61 MG/G (ref 0–30)

## 2024-01-15 ENCOUNTER — OFFICE VISIT (OUTPATIENT)
Dept: ENDOCRINOLOGY | Age: 34
End: 2024-01-15
Payer: COMMERCIAL

## 2024-01-15 VITALS
HEART RATE: 93 BPM | DIASTOLIC BLOOD PRESSURE: 88 MMHG | HEIGHT: 66 IN | WEIGHT: 315 LBS | BODY MASS INDEX: 50.62 KG/M2 | OXYGEN SATURATION: 99 % | SYSTOLIC BLOOD PRESSURE: 134 MMHG

## 2024-01-15 DIAGNOSIS — E11.65 UNCONTROLLED TYPE 2 DIABETES MELLITUS WITH HYPERGLYCEMIA (HCC): Primary | ICD-10-CM

## 2024-01-15 DIAGNOSIS — E78.2 MIXED HYPERLIPIDEMIA: ICD-10-CM

## 2024-01-15 DIAGNOSIS — I10 ESSENTIAL HYPERTENSION: ICD-10-CM

## 2024-01-15 DIAGNOSIS — E05.90 SUBCLINICAL HYPERTHYROIDISM: ICD-10-CM

## 2024-01-15 DIAGNOSIS — I50.22 CHRONIC SYSTOLIC CONGESTIVE HEART FAILURE (HCC): ICD-10-CM

## 2024-01-15 PROCEDURE — 3075F SYST BP GE 130 - 139MM HG: CPT | Performed by: PHYSICIAN ASSISTANT

## 2024-01-15 PROCEDURE — 3046F HEMOGLOBIN A1C LEVEL >9.0%: CPT | Performed by: PHYSICIAN ASSISTANT

## 2024-01-15 PROCEDURE — 99214 OFFICE O/P EST MOD 30 MIN: CPT | Performed by: PHYSICIAN ASSISTANT

## 2024-01-15 PROCEDURE — 3079F DIAST BP 80-89 MM HG: CPT | Performed by: PHYSICIAN ASSISTANT

## 2024-01-15 RX ORDER — METHIMAZOLE 5 MG/1
2.5 TABLET ORAL DAILY
Qty: 45 TABLET | Refills: 3 | Status: SHIPPED | OUTPATIENT
Start: 2024-01-15

## 2024-01-15 RX ORDER — LISINOPRIL 40 MG/1
20 TABLET ORAL DAILY
Qty: 90 TABLET | Refills: 3 | Status: SHIPPED | OUTPATIENT
Start: 2024-01-15

## 2024-01-15 RX ORDER — TIRZEPATIDE 7.5 MG/.5ML
7.5 INJECTION, SOLUTION SUBCUTANEOUS WEEKLY
Qty: 2 ML | Refills: 1 | Status: SHIPPED | OUTPATIENT
Start: 2024-01-15

## 2024-01-15 NOTE — PROGRESS NOTES
Medications:  Reviewed in chart.        Review of Systems    Vital Signs:  /88 (Site: Right Upper Arm, Position: Sitting, Cuff Size: Large Adult)   Pulse 93   Ht 1.676 m (5' 6\")   Wt (!) 155 kg (341 lb 12.8 oz)   SpO2 99%   BMI 55.17 kg/m²       Physical Exam  Constitutional:       Appearance: Normal appearance. He is obese.   Neck:      Thyroid: No thyromegaly.   Cardiovascular:      Rate and Rhythm: Normal rate and regular rhythm.   Pulmonary:      Effort: Pulmonary effort is normal.      Breath sounds: Normal breath sounds.   Abdominal:      Palpations: Abdomen is soft.   Feet:      Right foot:      Protective Sensation: 3 sites tested.  3 sites sensed.      Left foot:      Protective Sensation: 3 sites tested.  3 sites sensed.   Lymphadenopathy:      Cervical: No cervical adenopathy.   Skin:     General: Skin is warm and dry.   Neurological:      General: No focal deficit present.      Mental Status: He is alert.   Psychiatric:         Mood and Affect: Mood normal.         Behavior: Behavior normal.         Thought Content: Thought content normal.         Judgment: Judgment normal.             Return in about 6 months (around 7/15/2024).        Portions of this note were generated with the assistance of voice recogniton software.  As such, some errors in transcription may be present.

## 2024-01-26 ENCOUNTER — TELEPHONE (OUTPATIENT)
Age: 34
End: 2024-01-26

## 2024-01-26 DIAGNOSIS — R06.83 SNORING: ICD-10-CM

## 2024-01-26 DIAGNOSIS — G47.33 OSA (OBSTRUCTIVE SLEEP APNEA): Primary | ICD-10-CM

## 2024-01-26 NOTE — TELEPHONE ENCOUNTER
----- Message from Eric Parry MD sent at 1/25/2024  1:03 PM EST -----  Regarding: RE: Sleep apnea  Contact: [phone]  yes  ----- Message -----  From: Minnie Garcia MA  Sent: 1/25/2024   1:02 PM EST  To: Eric Parry MD  Subject: FW: Sleep apnea                                  Okay to referral to sleep specialist?  ----- Message -----  From: Delia Solares MA  Sent: 1/25/2024  10:38 AM EST  To: Minnie Garcia MA  Subject: FW: Sleep apnea                                    ----- Message -----  From: Charly Beasley \"Dj\"  Sent: 1/25/2024  10:19 AM EST  To: hospitals Cardiology Clinical Staff  Subject: Sleep apnea                                      Can please refer me to [facility]. I need new CAPA supplies and a new Study

## 2024-02-21 ASSESSMENT — PATIENT HEALTH QUESTIONNAIRE - PHQ9
1. LITTLE INTEREST OR PLEASURE IN DOING THINGS: 0
SUM OF ALL RESPONSES TO PHQ QUESTIONS 1-9: 0
2. FEELING DOWN, DEPRESSED OR HOPELESS: NOT AT ALL
2. FEELING DOWN, DEPRESSED OR HOPELESS: 0
SUM OF ALL RESPONSES TO PHQ QUESTIONS 1-9: 0
1. LITTLE INTEREST OR PLEASURE IN DOING THINGS: NOT AT ALL
SUM OF ALL RESPONSES TO PHQ9 QUESTIONS 1 & 2: 0
SUM OF ALL RESPONSES TO PHQ9 QUESTIONS 1 & 2: 0

## 2024-02-28 ENCOUNTER — OFFICE VISIT (OUTPATIENT)
Dept: INTERNAL MEDICINE CLINIC | Facility: CLINIC | Age: 34
End: 2024-02-28
Payer: MEDICAID

## 2024-02-28 VITALS
OXYGEN SATURATION: 98 % | HEIGHT: 66 IN | DIASTOLIC BLOOD PRESSURE: 64 MMHG | SYSTOLIC BLOOD PRESSURE: 124 MMHG | HEART RATE: 86 BPM | BODY MASS INDEX: 50.62 KG/M2 | WEIGHT: 315 LBS

## 2024-02-28 DIAGNOSIS — E11.9 DIABETES TYPE 2, NO OCULAR INVOLVEMENT (HCC): ICD-10-CM

## 2024-02-28 DIAGNOSIS — I10 ESSENTIAL HYPERTENSION: Primary | ICD-10-CM

## 2024-02-28 DIAGNOSIS — E66.01 MORBID OBESITY (HCC): ICD-10-CM

## 2024-02-28 DIAGNOSIS — E78.2 MIXED HYPERLIPIDEMIA: ICD-10-CM

## 2024-02-28 DIAGNOSIS — E11.65 UNCONTROLLED TYPE 2 DIABETES MELLITUS WITH HYPERGLYCEMIA (HCC): ICD-10-CM

## 2024-02-28 DIAGNOSIS — I50.9 CHRONIC CONGESTIVE HEART FAILURE, UNSPECIFIED HEART FAILURE TYPE (HCC): ICD-10-CM

## 2024-02-28 PROCEDURE — 99214 OFFICE O/P EST MOD 30 MIN: CPT | Performed by: INTERNAL MEDICINE

## 2024-02-28 PROCEDURE — 3074F SYST BP LT 130 MM HG: CPT | Performed by: INTERNAL MEDICINE

## 2024-02-28 PROCEDURE — 3046F HEMOGLOBIN A1C LEVEL >9.0%: CPT | Performed by: INTERNAL MEDICINE

## 2024-02-28 PROCEDURE — 3078F DIAST BP <80 MM HG: CPT | Performed by: INTERNAL MEDICINE

## 2024-02-28 SDOH — ECONOMIC STABILITY: FOOD INSECURITY: WITHIN THE PAST 12 MONTHS, YOU WORRIED THAT YOUR FOOD WOULD RUN OUT BEFORE YOU GOT MONEY TO BUY MORE.: NEVER TRUE

## 2024-02-28 SDOH — ECONOMIC STABILITY: FOOD INSECURITY: WITHIN THE PAST 12 MONTHS, THE FOOD YOU BOUGHT JUST DIDN'T LAST AND YOU DIDN'T HAVE MONEY TO GET MORE.: NEVER TRUE

## 2024-02-28 SDOH — ECONOMIC STABILITY: INCOME INSECURITY: HOW HARD IS IT FOR YOU TO PAY FOR THE VERY BASICS LIKE FOOD, HOUSING, MEDICAL CARE, AND HEATING?: NOT HARD AT ALL

## 2024-02-28 ASSESSMENT — ENCOUNTER SYMPTOMS
COUGH: 0
ABDOMINAL DISTENTION: 0
SHORTNESS OF BREATH: 0

## 2024-02-28 NOTE — PROGRESS NOTES
Breath sounds: Normal breath sounds.   Musculoskeletal:      Right lower leg: Edema present.      Left lower leg: Edema present.   Neurological:      General: No focal deficit present.      Mental Status: He is alert and oriented to person, place, and time.   Psychiatric:         Mood and Affect: Mood normal.          Recent labs:    Hemoglobin A1C   Date Value Ref Range Status   01/08/2024 9.9 (H) 4.8 - 5.6 % Final        Lab Results   Component Value Date    CHOL 217 (H) 01/08/2024    CHOL 236 (H) 01/31/2023    CHOL 190 03/08/2022     Lab Results   Component Value Date    TRIG 360 (H) 01/08/2024    TRIG 579 (H) 01/31/2023    TRIG 213 (H) 03/08/2022     Lab Results   Component Value Date    HDL 33 (L) 01/08/2024    HDL 34 (L) 01/31/2023    HDL 40 03/08/2022     Lab Results   Component Value Date    LDLCALC 112 (H) 01/08/2024    LDLCALC Not calculated due to elevated triglyceride level 01/31/2023    LDLCALC 113 (H) 03/08/2022     Lab Results   Component Value Date    VLDL 37 03/08/2022    VLDL 126 (H) 12/07/2021     Lab Results   Component Value Date    CHOLHDLRATIO 6.6 01/08/2024    CHOLHDLRATIO 6.9 01/31/2023     Lab Results   Component Value Date    TSH 0.722 03/08/2022    TSHELE 0.45 01/31/2023    KCX1QST 0.446 01/08/2024         Lab Results   Component Value Date     01/08/2024    K 3.9 01/08/2024     01/08/2024    CO2 29 01/08/2024    BUN 17 01/08/2024    CREATININE 0.90 01/08/2024    GLUCOSE 189 (H) 01/08/2024    CALCIUM 9.1 01/08/2024    PROT 7.9 01/08/2024    LABALBU 3.6 01/08/2024    BILITOT 0.9 01/08/2024    ALKPHOS 98 01/08/2024    AST 24 01/08/2024    ALT 60 01/08/2024    LABGLOM >60 01/08/2024    GFRAA >60 08/29/2022    AGRATIO 0.8 01/08/2024    GLOB 4.3 01/08/2024       Lab Results   Component Value Date    WBC 7.5 01/08/2024    HGB 16.5 01/08/2024    HCT 48.7 01/08/2024    MCV 85.7 01/08/2024     01/08/2024             This document was generated with the aid of voice recognition

## 2024-05-11 ENCOUNTER — TELEPHONE (OUTPATIENT)
Dept: ENDOCRINOLOGY | Age: 34
End: 2024-05-11

## 2024-05-11 DIAGNOSIS — E11.65 UNCONTROLLED TYPE 2 DIABETES MELLITUS WITH HYPERGLYCEMIA (HCC): Primary | ICD-10-CM

## 2024-05-11 RX ORDER — INSULIN GLARGINE-YFGN 100 [IU]/ML
75 INJECTION, SOLUTION SUBCUTANEOUS 2 TIMES DAILY
Qty: 45 ML | Refills: 11 | Status: SHIPPED | OUTPATIENT
Start: 2024-05-11

## 2024-05-13 NOTE — TELEPHONE ENCOUNTER
Patient called after-hours answering service in need of a refill on insulin glargine.  Prescription was sent electronically.

## 2024-05-15 ENCOUNTER — OFFICE VISIT (OUTPATIENT)
Age: 34
End: 2024-05-15
Payer: COMMERCIAL

## 2024-05-15 VITALS
HEART RATE: 84 BPM | SYSTOLIC BLOOD PRESSURE: 126 MMHG | DIASTOLIC BLOOD PRESSURE: 84 MMHG | WEIGHT: 315 LBS | HEIGHT: 66 IN | BODY MASS INDEX: 50.62 KG/M2

## 2024-05-15 DIAGNOSIS — I50.22 CHRONIC SYSTOLIC (CONGESTIVE) HEART FAILURE (HCC): Primary | ICD-10-CM

## 2024-05-15 DIAGNOSIS — I10 ESSENTIAL HYPERTENSION: ICD-10-CM

## 2024-05-15 DIAGNOSIS — G47.33 OSA (OBSTRUCTIVE SLEEP APNEA): ICD-10-CM

## 2024-05-15 DIAGNOSIS — E11.9 TYPE 2 DIABETES MELLITUS WITHOUT COMPLICATION, WITHOUT LONG-TERM CURRENT USE OF INSULIN (HCC): ICD-10-CM

## 2024-05-15 PROCEDURE — 99214 OFFICE O/P EST MOD 30 MIN: CPT | Performed by: INTERNAL MEDICINE

## 2024-05-15 PROCEDURE — 3046F HEMOGLOBIN A1C LEVEL >9.0%: CPT | Performed by: INTERNAL MEDICINE

## 2024-05-15 PROCEDURE — 3074F SYST BP LT 130 MM HG: CPT | Performed by: INTERNAL MEDICINE

## 2024-05-15 PROCEDURE — 3079F DIAST BP 80-89 MM HG: CPT | Performed by: INTERNAL MEDICINE

## 2024-05-15 NOTE — PROGRESS NOTES
[facility] CARDIOLOGY, [address], [address] [place] [address]  PHONE: [phone]    SUBJECTIVE: /HPI  Charly Beasley (1990) is a 33 y.o. male seen for a follow up visit regarding the following:   Specialty Problems          Cardiology Problems    Essential hypertension        Congestive heart failure (HCC)        Mixed hyperlipidemia         Pt is a 33 y.o. male doing well at this time with no complaints of syncope, chest pain, palpitations, or dyspnea.      Past Medical History, Past Surgical History, Family history, Social History, and Medications were all reviewed with the patient today and updated as necessary.    Allergies   Allergen Reactions    Bee Venom Anaphylaxis    Other Anaphylaxis    Barium Iodide Other (See Comments)     Mother's reaction    Bee Pollen Other (See Comments)     Past Medical History:   Diagnosis Date    Anasarca     Congestive heart failure (HCC)     sees Dr Parry    Hypertension     Lazy eye of right side     had since infancy    Mixed hyperlipidemia     Morbid obesity (HCC)     NAFLD (nonalcoholic fatty liver disease)     MARK (obstructive sleep apnea)     Type 2 diabetes mellitus (HCC)      Past Surgical History:   Procedure Laterality Date    GASTRIC BYPASS SURGERY  12/26/2018    gastric     TONSILLECTOMY       Family History   Problem Relation Age of Onset    Diabetes Paternal Grandfather     Diabetes Father     Heart Disease Paternal Grandfather     Hypertension Paternal Grandmother     Heart Disease Paternal Grandmother     Hypertension Maternal Grandfather     Heart Disease Maternal Grandfather     Hypertension Maternal Grandmother     Heart Disease Maternal Grandmother     Stroke Mother     Hypertension Mother     Diabetes Mother     Heart Disease Father     Hypertension Father     Hypertension Paternal Grandfather       Social History     Tobacco Use    Smoking status: Never    Smokeless tobacco: Never   Substance Use Topics    Alcohol use: No

## 2024-05-22 NOTE — PROGRESS NOTES
[facility] ENDOCRINOLOGY   AND   THYROID NODULE CLINIC    Meron Chung PA-C  [facility]  [address], [address] [address]  Phone [phone]  Facsimile [phone]          Charly Beasley is a 33 y.o. male seen 5/23/2024 for follow up evaluation of type 2 diabetes with CHF        Assessment and Plan:      1. Uncontrolled type 2 diabetes mellitus with hyperglycemia (HCC)  Glycemic control is improved but remains suboptimal. Patient is tolerating 5 mg Mounjaro and 7.5 mg but access is an issue. Printed RX for 10mg dose given to pt    Lifestyle reviewed    Patient is displaying improved home foot care with drastically improved hygiene which was applauded and encouraged    Continue to drink water, to walk daily for exercise    May need prandial insulin, checking mostly fasting. Encouraged to check in pairs. RX CGM    At today's visit we discussed sequela associated with uncontrolled diabetes including increased risk of stroke, heart attack, kidney failure, amputation, retinopathy, neuropathy, and nephropathy.  Patient was strongly encouraged to comply with treatment regimen as well as dietary and exercise recommendations to aid in control of this chronic disease to help prevent complications associated with uncontrolled diabetes.    - AMB POC HEMOGLOBIN A1C  - MOUNJARO 10 MG/0.5ML SOPN SC injection; Inject 0.5 mLs into the skin once a week E11.65  Dispense: 6 mL; Refill: 1  - Continuous Glucose Sensor (FREESTYLE JORGE 2 SENSOR) MISC; Use to monitor glucose E11.65  Dispense: 6 each; Refill: 3  - Continuous Glucose  (FREESTYLE JORGE 2 READER) KASSANDRA; Use to monitor glucose, E11.65  Dispense: 1 each; Refill: 0  - JARDIANCE 25 MG tablet; Take 1 tablet by mouth daily  Dispense: 30 tablet; Refill: 11  - HM DIABETES FOOT EXAM    3. Essential hypertension  stable  BP Readings from Last 3 Encounters:   05/23/24 122/76   05/15/24 126/84   05/07/24

## 2024-05-23 ENCOUNTER — OFFICE VISIT (OUTPATIENT)
Dept: ENDOCRINOLOGY | Age: 34
End: 2024-05-23
Payer: COMMERCIAL

## 2024-05-23 VITALS
HEIGHT: 66 IN | OXYGEN SATURATION: 97 % | SYSTOLIC BLOOD PRESSURE: 122 MMHG | HEART RATE: 76 BPM | BODY MASS INDEX: 50.62 KG/M2 | WEIGHT: 315 LBS | DIASTOLIC BLOOD PRESSURE: 76 MMHG

## 2024-05-23 DIAGNOSIS — I50.22 CHRONIC SYSTOLIC (CONGESTIVE) HEART FAILURE (HCC): ICD-10-CM

## 2024-05-23 DIAGNOSIS — E11.65 UNCONTROLLED TYPE 2 DIABETES MELLITUS WITH HYPERGLYCEMIA (HCC): Primary | ICD-10-CM

## 2024-05-23 DIAGNOSIS — I10 ESSENTIAL HYPERTENSION: ICD-10-CM

## 2024-05-23 DIAGNOSIS — E05.90 SUBCLINICAL HYPERTHYROIDISM: ICD-10-CM

## 2024-05-23 DIAGNOSIS — E78.2 MIXED HYPERLIPIDEMIA: ICD-10-CM

## 2024-05-23 LAB — HBA1C MFR BLD: 8 %

## 2024-05-23 PROCEDURE — 99214 OFFICE O/P EST MOD 30 MIN: CPT | Performed by: PHYSICIAN ASSISTANT

## 2024-05-23 PROCEDURE — 3078F DIAST BP <80 MM HG: CPT | Performed by: PHYSICIAN ASSISTANT

## 2024-05-23 PROCEDURE — 3046F HEMOGLOBIN A1C LEVEL >9.0%: CPT | Performed by: PHYSICIAN ASSISTANT

## 2024-05-23 PROCEDURE — 3074F SYST BP LT 130 MM HG: CPT | Performed by: PHYSICIAN ASSISTANT

## 2024-05-23 PROCEDURE — 83036 HEMOGLOBIN GLYCOSYLATED A1C: CPT | Performed by: PHYSICIAN ASSISTANT

## 2024-05-23 RX ORDER — EMPAGLIFLOZIN 25 MG/1
25 TABLET, FILM COATED ORAL DAILY
Qty: 30 TABLET | Refills: 11 | Status: SHIPPED | OUTPATIENT
Start: 2024-05-23

## 2024-05-23 RX ORDER — METHIMAZOLE 5 MG/1
2.5 TABLET ORAL DAILY
Qty: 45 TABLET | Refills: 3 | Status: SHIPPED | OUTPATIENT
Start: 2024-05-23

## 2024-05-23 RX ORDER — TIRZEPATIDE 10 MG/.5ML
10 INJECTION, SOLUTION SUBCUTANEOUS WEEKLY
Qty: 6 ML | Refills: 1 | Status: SHIPPED | OUTPATIENT
Start: 2024-05-23

## 2024-05-23 RX ORDER — ROSUVASTATIN CALCIUM 40 MG/1
40 TABLET, COATED ORAL EVERY EVENING
Qty: 90 TABLET | Refills: 3 | Status: SHIPPED | OUTPATIENT
Start: 2024-05-23

## 2024-05-29 ENCOUNTER — TELEPHONE (OUTPATIENT)
Dept: ENDOCRINOLOGY | Age: 34
End: 2024-05-29

## 2024-06-05 RX ORDER — DAPAGLIFLOZIN 10 MG/1
10 TABLET, FILM COATED ORAL EVERY MORNING
Qty: 30 TABLET | Refills: 5 | Status: SHIPPED | OUTPATIENT
Start: 2024-06-05

## 2024-06-05 NOTE — TELEPHONE ENCOUNTER
Farxiga sent to [facility] to REPLACE jardiance    Please notify patient and ask him to let us know if any issues getting or tolerating medication

## 2024-06-06 NOTE — TELEPHONE ENCOUNTER
Spoke with patient. Understands the change in medication and to inform us if any issues picking up or tolerating.

## 2024-06-10 DIAGNOSIS — E11.65 UNCONTROLLED TYPE 2 DIABETES MELLITUS WITH HYPERGLYCEMIA (HCC): ICD-10-CM

## 2024-06-11 RX ORDER — TIRZEPATIDE 10 MG/.5ML
10 INJECTION, SOLUTION SUBCUTANEOUS WEEKLY
Qty: 6 ML | Refills: 1 | Status: SHIPPED | OUTPATIENT
Start: 2024-06-11

## 2024-06-12 ENCOUNTER — TELEPHONE (OUTPATIENT)
Dept: ENDOCRINOLOGY | Age: 34
End: 2024-06-12

## 2024-06-12 DIAGNOSIS — E11.65 UNCONTROLLED TYPE 2 DIABETES MELLITUS WITH HYPERGLYCEMIA (HCC): ICD-10-CM

## 2024-06-12 RX ORDER — TIRZEPATIDE 7.5 MG/.5ML
7.5 INJECTION, SOLUTION SUBCUTANEOUS WEEKLY
Qty: 2 ML | Refills: 1 | Status: SHIPPED | OUTPATIENT
Start: 2024-06-12

## 2024-06-12 NOTE — TELEPHONE ENCOUNTER
Phone call from patient stating pharmacy does not have any Mounjaro 10mg. On nationwide backorder.    Could you please call in the 7.5mg to [facility] on [address] [facility].

## 2024-07-18 NOTE — PROGRESS NOTES
----- Message from [address] [place] [address] sent at 8/10/2020  2:05 PM EDT -----  Covering dr Johanna lamar  Please let pt know no nodules meet criteria for biopsy  Need u/s in 1 year murmurs, or edema.   GI:   No dysphagia, heartburn reflux, nausea/vomiting, diarrhea, abdominal pain, or bleeding.   :   Denies history of dysuria, hematuria, polyuria, or decreased urine output.   MS:   No history of myalgias, arthralgias, bone pain, or muscle cramps.   SKIN:   No history of rashes, jaundice, cyanosis, nodules, or ulcers.   ENDO:   Negative for heat or cold intolerance.  No history of DM.   PSYCH:   Negative for anxiety, depression, insomnia, hallucinations.   NEURO:   There is no history of AMS, persistent headache, decreased level of consciousness, seizures, or motor or sensory deficits.      PHYSICAL EXAM:    Vitals:    07/19/24 0831   BP: 118/76   Pulse: 99   Resp: 19   Temp: 97.6 °F (36.4 °C)   TempSrc: Temporal   SpO2: 98%   Weight: (!) 156.5 kg (345 lb)   Height: 1.575 m (5' 2\")     Body mass index is 63.1 kg/m².       GENERAL APPEARANCE:   The patient is over weight and in no respiratory distress.   HEENT:   PERRL.  Conjunctivae unremarkable.   Nasal mucosa is without epistaxis, exudate, or polyps.  Gums and dentition are unremarkable.  There is oropharyngeal narrowing.  TMs are clear.   NECK/LYMPHATIC:   Symmetrical with no elevation of jugular venous pulsation.  Trachea midline. No thyroid enlargement.  No cervical adenopathy.   LUNGS:   Normal respiratory effort with symmetrical lung expansion.   Breath sounds clear.   HEART:   There is a regular rate and rhythm.  No murmur, rub, or gallop.  There is no edema in the lower extremities.   ABDOMEN:   Soft and non-tender.  No hepatosplenomegaly.  Bowel sounds are normal.     SKIN:   There are no rashes, cyanosis, jaundice, or ecchymosis present.   EXTREMITIES:   The extremities are unremarkable without clubbing, cyanosis, joint inflammation, degenerative, or ischemic change.   MUSCULOSKELETAL:   There is no abnormal tone, muscle atrophy, or abnormal movement present.   NEURO:   The patient is alert and oriented to person, place, and time.

## 2024-07-19 ENCOUNTER — OFFICE VISIT (OUTPATIENT)
Dept: SLEEP MEDICINE | Age: 34
End: 2024-07-19
Payer: COMMERCIAL

## 2024-07-19 VITALS
DIASTOLIC BLOOD PRESSURE: 76 MMHG | WEIGHT: 315 LBS | BODY MASS INDEX: 57.97 KG/M2 | RESPIRATION RATE: 19 BRPM | HEART RATE: 99 BPM | OXYGEN SATURATION: 98 % | HEIGHT: 62 IN | TEMPERATURE: 97.6 F | SYSTOLIC BLOOD PRESSURE: 118 MMHG

## 2024-07-19 DIAGNOSIS — G47.34 NOCTURNAL HYPOXEMIA: ICD-10-CM

## 2024-07-19 DIAGNOSIS — E66.01 MORBID OBESITY WITH BMI OF 60.0-69.9, ADULT (HCC): ICD-10-CM

## 2024-07-19 DIAGNOSIS — G47.00 PERSISTENT DISORDER OF INITIATING OR MAINTAINING SLEEP: ICD-10-CM

## 2024-07-19 DIAGNOSIS — G47.33 OSA (OBSTRUCTIVE SLEEP APNEA): Primary | ICD-10-CM

## 2024-07-19 DIAGNOSIS — G47.8 NON-RESTORATIVE SLEEP: ICD-10-CM

## 2024-07-19 PROCEDURE — 3078F DIAST BP <80 MM HG: CPT | Performed by: STUDENT IN AN ORGANIZED HEALTH CARE EDUCATION/TRAINING PROGRAM

## 2024-07-19 PROCEDURE — 99203 OFFICE O/P NEW LOW 30 MIN: CPT | Performed by: STUDENT IN AN ORGANIZED HEALTH CARE EDUCATION/TRAINING PROGRAM

## 2024-07-19 PROCEDURE — G2211 COMPLEX E/M VISIT ADD ON: HCPCS | Performed by: STUDENT IN AN ORGANIZED HEALTH CARE EDUCATION/TRAINING PROGRAM

## 2024-07-19 PROCEDURE — 3074F SYST BP LT 130 MM HG: CPT | Performed by: STUDENT IN AN ORGANIZED HEALTH CARE EDUCATION/TRAINING PROGRAM

## 2024-07-19 ASSESSMENT — SLEEP AND FATIGUE QUESTIONNAIRES
HOW LIKELY ARE YOU TO NOD OFF OR FALL ASLEEP WHEN YOU ARE A PASSENGER IN A CAR FOR AN HOUR WITHOUT A BREAK: MODERATE CHANCE OF DOZING
HOW LIKELY ARE YOU TO NOD OFF OR FALL ASLEEP WHILE SITTING QUIETLY AFTER LUNCH WITHOUT ALCOHOL: WOULD NEVER DOZE
ESS TOTAL SCORE: 6
HOW LIKELY ARE YOU TO NOD OFF OR FALL ASLEEP WHILE WATCHING TV: WOULD NEVER DOZE
HOW LIKELY ARE YOU TO NOD OFF OR FALL ASLEEP WHILE SITTING AND TALKING TO SOMEONE: WOULD NEVER DOZE
HOW LIKELY ARE YOU TO NOD OFF OR FALL ASLEEP WHILE SITTING INACTIVE IN A PUBLIC PLACE: WOULD NEVER DOZE
HOW LIKELY ARE YOU TO NOD OFF OR FALL ASLEEP WHILE SITTING AND READING: SLIGHT CHANCE OF DOZING
HOW LIKELY ARE YOU TO NOD OFF OR FALL ASLEEP IN A CAR, WHILE STOPPED FOR A FEW MINUTES IN TRAFFIC: WOULD NEVER DOZE
HOW LIKELY ARE YOU TO NOD OFF OR FALL ASLEEP WHILE LYING DOWN TO REST IN THE AFTERNOON WHEN CIRCUMSTANCES PERMIT: HIGH CHANCE OF DOZING

## 2024-07-19 NOTE — PATIENT INSTRUCTIONS
avoid:    Limit caffeine (coffee, tea, caffeinated sodas) during the day, and dont have any for at least 4 to 6 hours before bedtime.    Don't drink alcohol before bedtime.  Alcohol can cause you to wake up more often during the night.    Don't smoke or use tobacco, especially in the evening.  Nicotine can keep you awake.     Don't like in bed away for too long.  If you can't fall asleep, or if you wake up in the middle of the night and can't get back to sleep within 15 minutes or so, get out of bed and go to another room until you feel sleepy.    Don't take medicine right before bed that may keep you awake or make you feel hyper or enegerized.  Your doctor can tell you if your medicine may do this and if you can take it earlier in the day.    If you can't sleep:    Imagine yourself in a peaceful, pleasant scene.  Focus on the details and feelings of being in a place that is relaxing.     Get up and do a quiet or boring activity until you feel sleepy.    Don't drink liquids after 6 p.m. if you wake up often because you have to go to the bathroom.    Where can you learn more?    Go to https://www.healthwise.net/GoodHelpConnections    Enter J942 in the search box to learn more about \"Learning About Sleeping Well.\"

## 2024-08-07 ENCOUNTER — HOSPITAL ENCOUNTER (OUTPATIENT)
Dept: SLEEP CENTER | Age: 34
Discharge: HOME | End: 2024-08-10

## 2024-08-15 ENCOUNTER — PATIENT MESSAGE (OUTPATIENT)
Dept: SLEEP MEDICINE | Age: 34
End: 2024-08-15

## 2024-08-15 DIAGNOSIS — G47.34 NOCTURNAL HYPOXEMIA: ICD-10-CM

## 2024-08-15 DIAGNOSIS — G47.33 OSA (OBSTRUCTIVE SLEEP APNEA): Primary | ICD-10-CM

## 2024-08-15 NOTE — TELEPHONE ENCOUNTER
Pt has been notified of his positive sleep study via [employer].   Orders for pt to start on CPAP have been placed. Please place these orders in [employer].     Thank you.

## 2024-10-04 ENCOUNTER — TELEPHONE (OUTPATIENT)
Dept: ENDOCRINOLOGY | Age: 34
End: 2024-10-04

## 2024-11-11 ENCOUNTER — OFFICE VISIT (OUTPATIENT)
Age: 34
End: 2024-11-11
Payer: COMMERCIAL

## 2024-11-11 VITALS
HEART RATE: 80 BPM | SYSTOLIC BLOOD PRESSURE: 114 MMHG | DIASTOLIC BLOOD PRESSURE: 80 MMHG | HEIGHT: 62 IN | BODY MASS INDEX: 57.97 KG/M2 | WEIGHT: 315 LBS

## 2024-11-11 DIAGNOSIS — G47.33 OSA (OBSTRUCTIVE SLEEP APNEA): ICD-10-CM

## 2024-11-11 DIAGNOSIS — I50.22 CHRONIC SYSTOLIC (CONGESTIVE) HEART FAILURE (HCC): Primary | ICD-10-CM

## 2024-11-11 DIAGNOSIS — I10 ESSENTIAL HYPERTENSION: ICD-10-CM

## 2024-11-11 DIAGNOSIS — E11.9 TYPE 2 DIABETES MELLITUS WITHOUT COMPLICATION, WITHOUT LONG-TERM CURRENT USE OF INSULIN (HCC): ICD-10-CM

## 2024-11-11 PROCEDURE — 3079F DIAST BP 80-89 MM HG: CPT | Performed by: INTERNAL MEDICINE

## 2024-11-11 PROCEDURE — 3074F SYST BP LT 130 MM HG: CPT | Performed by: INTERNAL MEDICINE

## 2024-11-11 PROCEDURE — 3046F HEMOGLOBIN A1C LEVEL >9.0%: CPT | Performed by: INTERNAL MEDICINE

## 2024-11-11 PROCEDURE — 99214 OFFICE O/P EST MOD 30 MIN: CPT | Performed by: INTERNAL MEDICINE

## 2024-11-11 RX ORDER — FUROSEMIDE 20 MG/1
20 TABLET ORAL DAILY
Qty: 90 TABLET | Refills: 3 | Status: SHIPPED | OUTPATIENT
Start: 2024-11-11

## 2024-11-11 RX ORDER — FENOFIBRATE 145 MG/1
145 TABLET, COATED ORAL DAILY
Qty: 90 TABLET | Refills: 3 | Status: SHIPPED | OUTPATIENT
Start: 2024-11-11

## 2024-11-11 RX ORDER — SPIRONOLACTONE 25 MG/1
25 TABLET ORAL DAILY
Qty: 90 TABLET | Refills: 3 | Status: SHIPPED | OUTPATIENT
Start: 2024-11-11

## 2024-11-11 NOTE — PROGRESS NOTES
Insulin Pen Needle 32G X 6 MM MISC, Use with insulin up to 4 times daily, E11.65, Disp: 400 each, Rfl: 3    MOUNJARO 7.5 MG/0.5ML SOPN SC injection, Inject 0.5 mLs into the skin once a week E11.65 (Patient not taking: Reported on 11/11/2024), Disp: 2 mL, Rfl: 1    MOUNJARO 10 MG/0.5ML SOPN SC injection, Inject 0.5 mLs into the skin once a week E11.65 (Patient not taking: Reported on 11/11/2024), Disp: 6 mL, Rfl: 1    FARXIGA 10 MG tablet, Take 1 tablet by mouth every morning (Patient not taking: Reported on 11/11/2024), Disp: 30 tablet, Rfl: 5    Continuous Glucose Sensor (FREESTYLE JORGE 2 SENSOR) MISC, Use to monitor glucose E11.65 (Patient not taking: Reported on 7/19/2024), Disp: 6 each, Rfl: 3    Lancets Misc. (ACCU-CHEK FASTCLIX LANCET) KIT, Use to check glucose 4 times daily E11.65 (Patient not taking: Reported on 7/19/2024), Disp: 1 kit, Rfl: 1    Lancets (ONETOUCH DELICA PLUS [id number]) MISC, USE TO TEST 3 TIMES DAILY (Patient not taking: Reported on 7/19/2024), Disp: , Rfl:     Blood Glucose Monitoring Suppl (ONETOUCH VERIO) w/Device KIT, Use to check blood glucose four times daily E11.65 (Patient not taking: Reported on 7/19/2024), Disp: 1 kit, Rfl: 0    Cholecalciferol (D3 HIGH POTENCY) 50 MCG (2000 UT) CAPS, Take 1 capsule by mouth daily (Patient not taking: Reported on 5/23/2024), Disp: 90 capsule, Rfl: 3    CALCIUM PO, Take by mouth (Patient not taking: Reported on 5/23/2024), Disp: , Rfl:     cyanocobalamin 1000 MCG tablet, Take 1 tablet by mouth (Patient not taking: Reported on 5/23/2024), Disp: , Rfl:     Sod Fluoride-Potassium Nitrate 1.1-5 % PSTE, BRUSH TEETH FOR 2 MINUTES TWICE A DAY. EXPECTORATE. DO NOT EAT OR DINK AFTER BRUSHING (Patient not taking: Reported on 5/23/2024), Disp: , Rfl:     ROS:  Review of Systems - General ROS: negative for - chills, fatigue or fever  Hematological and Lymphatic ROS: negative for - bleeding problems, bruising or jaundice  Respiratory ROS: no cough, shortness

## 2024-11-21 ENCOUNTER — OFFICE VISIT (OUTPATIENT)
Dept: ENDOCRINOLOGY | Age: 34
End: 2024-11-21
Payer: COMMERCIAL

## 2024-11-21 VITALS
WEIGHT: 315 LBS | OXYGEN SATURATION: 98 % | SYSTOLIC BLOOD PRESSURE: 118 MMHG | HEART RATE: 81 BPM | DIASTOLIC BLOOD PRESSURE: 80 MMHG | BODY MASS INDEX: 61.09 KG/M2

## 2024-11-21 DIAGNOSIS — E11.65 UNCONTROLLED TYPE 2 DIABETES MELLITUS WITH HYPERGLYCEMIA (HCC): Primary | ICD-10-CM

## 2024-11-21 DIAGNOSIS — I50.22 CHRONIC SYSTOLIC (CONGESTIVE) HEART FAILURE (HCC): ICD-10-CM

## 2024-11-21 DIAGNOSIS — E78.2 MIXED HYPERLIPIDEMIA: ICD-10-CM

## 2024-11-21 DIAGNOSIS — I10 ESSENTIAL HYPERTENSION: ICD-10-CM

## 2024-11-21 LAB — HBA1C MFR BLD: 9.8 %

## 2024-11-21 PROCEDURE — 3046F HEMOGLOBIN A1C LEVEL >9.0%: CPT | Performed by: PHYSICIAN ASSISTANT

## 2024-11-21 PROCEDURE — 83036 HEMOGLOBIN GLYCOSYLATED A1C: CPT | Performed by: PHYSICIAN ASSISTANT

## 2024-11-21 PROCEDURE — 95251 CONT GLUC MNTR ANALYSIS I&R: CPT | Performed by: PHYSICIAN ASSISTANT

## 2024-11-21 PROCEDURE — 3079F DIAST BP 80-89 MM HG: CPT | Performed by: PHYSICIAN ASSISTANT

## 2024-11-21 PROCEDURE — 3074F SYST BP LT 130 MM HG: CPT | Performed by: PHYSICIAN ASSISTANT

## 2024-11-21 PROCEDURE — 99214 OFFICE O/P EST MOD 30 MIN: CPT | Performed by: PHYSICIAN ASSISTANT

## 2024-11-21 RX ORDER — BLOOD SUGAR DIAGNOSTIC
STRIP MISCELLANEOUS
Qty: 400 EACH | Refills: 3 | Status: SHIPPED | OUTPATIENT
Start: 2024-11-21

## 2024-11-21 RX ORDER — TIRZEPATIDE 10 MG/.5ML
10 INJECTION, SOLUTION SUBCUTANEOUS
Qty: 6 ML | Refills: 3 | Status: SHIPPED | OUTPATIENT
Start: 2024-11-21

## 2024-11-21 RX ORDER — LANCETS 30 GAUGE
EACH MISCELLANEOUS
Qty: 400 EACH | Refills: 3 | Status: SHIPPED | OUTPATIENT
Start: 2024-11-21

## 2024-11-21 NOTE — PROGRESS NOTES
[facility] ENDOCRINOLOGY   AND   THYROID NODULE CLINIC    Meron Chung PA-C  [facility] Endocrinology and Thyroid Nodule Clinic  [address], [place] [address]  Phone [phone]  Facsimile [phone]          Charly Beasley is a 34 y.o. male seen 11/21/2024 for follow up evaluation of type 2 diabetes with CHF        Assessment and Plan:    Interpretation of 72 hour glucose monitor:  At least 72 hours of data were reviewed.  The patient utilizes a cliff 2 continuous glucose monitoring system.  The average glucose during the reviewed timeframe was 345 .  There is a pattern of constant hyperglycemia that is worse post prandially.    1. Uncontrolled type 2 diabetes mellitus with hyperglycemia (HCC)  Patient returns clinic with a worsening glycemic control.  Having difficulty keeping sensor in place.  We discussed skin tac and over patches.  Patient is changed from Jardiance to Farxiga without side effect.  He was having difficulty obtaining Mounjaro and switched to Trulicity with markedly worse glycemic control.  Stop Trulicity restart Mounjaro at the 10 mg dose with plans to uptitrate to the highest tolerated dose    Home footcare again discussed at length as there is clear evidence that patient has been walking barefoot.  Risk of amputation reviewed    Exercise applauded and encouraged    Patient encouraged to check his blood sugar with sensor or fingerstick glucometer before meals and at bedtime so that he may benefit from 5 per 50 greater than 150 correction.  Patient may need prandial insulin however given morbid obesity I would like to maximize agents that are weight neutral or show weight loss benefit first    At today's visit we discussed sequela associated with uncontrolled diabetes including increased risk of stroke, heart attack, kidney failure, amputation, retinopathy, neuropathy, and nephropathy.  Patient was strongly encouraged to comply with treatment regimen as

## 2024-12-17 NOTE — PROGRESS NOTES
Needle 32G X 6 MM MISC Use with insulin up to 4 times daily, E11.65    Cholecalciferol (D3 HIGH POTENCY) 50 MCG (2000 UT) CAPS Take 1 capsule by mouth daily    CALCIUM PO Take by mouth    cyanocobalamin 1000 MCG tablet Take 1 tablet by mouth    Sod Fluoride-Potassium Nitrate 1.1-5 % PSTE BRUSH TEETH FOR 2 MINUTES TWICE A DAY. EXPECTORATE. DO NOT EAT OR DINK AFTER BRUSHING    Lancets Misc. (ACCU-CHEK FASTCLIX LANCET) KIT Use to check glucose 4 times daily E11.65 (Patient not taking: Reported on 7/19/2024)    Lancets (ONETOUCH DELICA PLUS [id number]) MISC USE TO TEST 3 TIMES DAILY (Patient not taking: Reported on 7/19/2024)     No current facility-administered medications for this visit.           REVIEW OF SYSTEMS:     CONSTITUTIONAL:   There is Negative history of fever, chills, night sweats.   Patient is  Positivefor weight loss, they are  Negative for  weight gain. Patient is  Negative  for fatigue and hypersomnia and is  on their CPAP.  Insomnia is   under control.      CARDIAC:   No chest pain, pressure, discomfort, palpitations, orthopnea, murmurs, or edema.     GI:   No dysphagia, heartburn reflux, nausea/vomiting, diarrhea, abdominal pain, or bleeding.     NEURO:    There is no history of AMS, persistent headache, decreased level of consciousness, seizures, or motor or sensory deficits.            PHYSICAL EXAM:    /82 (Site: Right Upper Arm, Position: Sitting)   Pulse 99   Resp 14   Ht 1.575 m (5' 2\")   Wt (!) 154.2 kg (340 lb)   SpO2 97%   BMI 62.19 kg/m²     Body mass index is 62.19 kg/m².    Constitutional:  the patient is morbidly obese  male not in distress  EENMT:  Sclera clear, pupils equal, oral mucosa moist, narrow oral airway Modified Streeter Stage 4, Nares are patent bilateral  Respiratory: CTA b/l. No Wheezing or Rhonchi.  Cardiovascular:  RRR without M,G,R  Gastrointestinal: soft and non-tender; with positive bowel sounds.  Musculoskeletal: warm without cyanosis. There is No

## 2024-12-18 ENCOUNTER — OFFICE VISIT (OUTPATIENT)
Age: 34
End: 2024-12-18

## 2024-12-18 VITALS
WEIGHT: 315 LBS | DIASTOLIC BLOOD PRESSURE: 82 MMHG | SYSTOLIC BLOOD PRESSURE: 130 MMHG | RESPIRATION RATE: 14 BRPM | HEIGHT: 62 IN | HEART RATE: 99 BPM | BODY MASS INDEX: 57.97 KG/M2 | OXYGEN SATURATION: 97 %

## 2024-12-18 DIAGNOSIS — E66.01 MORBID OBESITY WITH BMI OF 60.0-69.9, ADULT: ICD-10-CM

## 2024-12-18 DIAGNOSIS — G47.33 OSA (OBSTRUCTIVE SLEEP APNEA): Primary | ICD-10-CM

## 2024-12-18 DIAGNOSIS — G47.8 NON-RESTORATIVE SLEEP: ICD-10-CM

## 2024-12-18 DIAGNOSIS — G47.34 NOCTURNAL HYPOXEMIA: ICD-10-CM

## 2024-12-18 ASSESSMENT — SLEEP AND FATIGUE QUESTIONNAIRES
HOW LIKELY ARE YOU TO NOD OFF OR FALL ASLEEP WHILE SITTING AND TALKING TO SOMEONE: WOULD NEVER DOZE
ESS TOTAL SCORE: 7
HOW LIKELY ARE YOU TO NOD OFF OR FALL ASLEEP WHILE SITTING QUIETLY AFTER LUNCH WITHOUT ALCOHOL: MODERATE CHANCE OF DOZING
HOW LIKELY ARE YOU TO NOD OFF OR FALL ASLEEP WHILE SITTING AND READING: SLIGHT CHANCE OF DOZING
HOW LIKELY ARE YOU TO NOD OFF OR FALL ASLEEP WHILE LYING DOWN TO REST IN THE AFTERNOON WHEN CIRCUMSTANCES PERMIT: HIGH CHANCE OF DOZING
HOW LIKELY ARE YOU TO NOD OFF OR FALL ASLEEP WHILE SITTING INACTIVE IN A PUBLIC PLACE: WOULD NEVER DOZE
HOW LIKELY ARE YOU TO NOD OFF OR FALL ASLEEP WHEN YOU ARE A PASSENGER IN A CAR FOR AN HOUR WITHOUT A BREAK: SLIGHT CHANCE OF DOZING
HOW LIKELY ARE YOU TO NOD OFF OR FALL ASLEEP IN A CAR, WHILE STOPPED FOR A FEW MINUTES IN TRAFFIC: WOULD NEVER DOZE
HOW LIKELY ARE YOU TO NOD OFF OR FALL ASLEEP WHILE WATCHING TV: WOULD NEVER DOZE

## 2025-02-19 DIAGNOSIS — E11.65 UNCONTROLLED TYPE 2 DIABETES MELLITUS WITH HYPERGLYCEMIA (HCC): ICD-10-CM

## 2025-02-19 RX ORDER — INSULIN GLARGINE-YFGN 100 [IU]/ML
75 INJECTION, SOLUTION SUBCUTANEOUS 2 TIMES DAILY
Qty: 45 ML | Refills: 11 | Status: SHIPPED | OUTPATIENT
Start: 2025-02-19

## 2025-02-25 SDOH — ECONOMIC STABILITY: TRANSPORTATION INSECURITY
IN THE PAST 12 MONTHS, HAS THE LACK OF TRANSPORTATION KEPT YOU FROM MEDICAL APPOINTMENTS OR FROM GETTING MEDICATIONS?: NO

## 2025-02-25 SDOH — ECONOMIC STABILITY: INCOME INSECURITY: IN THE LAST 12 MONTHS, WAS THERE A TIME WHEN YOU WERE NOT ABLE TO PAY THE MORTGAGE OR RENT ON TIME?: NO

## 2025-02-25 SDOH — ECONOMIC STABILITY: FOOD INSECURITY: WITHIN THE PAST 12 MONTHS, THE FOOD YOU BOUGHT JUST DIDN'T LAST AND YOU DIDN'T HAVE MONEY TO GET MORE.: NEVER TRUE

## 2025-02-25 SDOH — ECONOMIC STABILITY: FOOD INSECURITY: WITHIN THE PAST 12 MONTHS, YOU WORRIED THAT YOUR FOOD WOULD RUN OUT BEFORE YOU GOT MONEY TO BUY MORE.: SOMETIMES TRUE

## 2025-02-25 SDOH — ECONOMIC STABILITY: TRANSPORTATION INSECURITY
IN THE PAST 12 MONTHS, HAS LACK OF TRANSPORTATION KEPT YOU FROM MEETINGS, WORK, OR FROM GETTING THINGS NEEDED FOR DAILY LIVING?: NO

## 2025-02-25 ASSESSMENT — PATIENT HEALTH QUESTIONNAIRE - PHQ9
1. LITTLE INTEREST OR PLEASURE IN DOING THINGS: NOT AT ALL
2. FEELING DOWN, DEPRESSED OR HOPELESS: NOT AT ALL
1. LITTLE INTEREST OR PLEASURE IN DOING THINGS: NOT AT ALL
SUM OF ALL RESPONSES TO PHQ QUESTIONS 1-9: 0
SUM OF ALL RESPONSES TO PHQ QUESTIONS 1-9: 0
SUM OF ALL RESPONSES TO PHQ9 QUESTIONS 1 & 2: 0
SUM OF ALL RESPONSES TO PHQ QUESTIONS 1-9: 0
SUM OF ALL RESPONSES TO PHQ9 QUESTIONS 1 & 2: 0
2. FEELING DOWN, DEPRESSED OR HOPELESS: NOT AT ALL
SUM OF ALL RESPONSES TO PHQ QUESTIONS 1-9: 0

## 2025-02-27 ENCOUNTER — TELEPHONE (OUTPATIENT)
Dept: ENDOCRINOLOGY | Age: 35
End: 2025-02-27

## 2025-02-28 ENCOUNTER — TELEMEDICINE (OUTPATIENT)
Dept: INTERNAL MEDICINE CLINIC | Facility: CLINIC | Age: 35
End: 2025-02-28
Payer: COMMERCIAL

## 2025-02-28 DIAGNOSIS — E66.01 MORBID OBESITY: ICD-10-CM

## 2025-02-28 DIAGNOSIS — E11.65 UNCONTROLLED TYPE 2 DIABETES MELLITUS WITH HYPERGLYCEMIA (HCC): ICD-10-CM

## 2025-02-28 DIAGNOSIS — J06.9 VIRAL UPPER RESPIRATORY TRACT INFECTION: Primary | ICD-10-CM

## 2025-02-28 DIAGNOSIS — I10 ESSENTIAL HYPERTENSION: ICD-10-CM

## 2025-02-28 DIAGNOSIS — E78.2 MIXED HYPERLIPIDEMIA: ICD-10-CM

## 2025-02-28 DIAGNOSIS — E05.90 SUBCLINICAL HYPERTHYROIDISM: ICD-10-CM

## 2025-02-28 DIAGNOSIS — I50.9 CHRONIC CONGESTIVE HEART FAILURE, UNSPECIFIED HEART FAILURE TYPE (HCC): ICD-10-CM

## 2025-02-28 PROCEDURE — 99214 OFFICE O/P EST MOD 30 MIN: CPT | Performed by: INTERNAL MEDICINE

## 2025-02-28 RX ORDER — ACYCLOVIR 400 MG/1
TABLET ORAL
Qty: 1 EACH | Refills: 0 | Status: SHIPPED | OUTPATIENT
Start: 2025-02-28

## 2025-02-28 RX ORDER — ACYCLOVIR 400 MG/1
TABLET ORAL
Qty: 12 EACH | Refills: 1 | Status: SHIPPED | OUTPATIENT
Start: 2025-02-28

## 2025-02-28 RX ORDER — DULAGLUTIDE 4.5 MG/.5ML
4.5 INJECTION, SOLUTION SUBCUTANEOUS WEEKLY
COMMUNITY

## 2025-02-28 ASSESSMENT — ENCOUNTER SYMPTOMS
ABDOMINAL DISTENTION: 0
ABDOMINAL PAIN: 0
SORE THROAT: 1

## 2025-02-28 NOTE — ASSESSMENT & PLAN NOTE
Patient was reminded to complete labs, previous orders from previous provider have , orders were updated,  Diabetes is currently suboptimal control, patient will continue to follow-up closely with endocrine, and adjust insulin based on results, we will attempt to get Dexcom approved, he will definitely benefit of continue monitoring device  He also was advised to complete follow-ups with cardiology, and also continue medications as they recommended,  he reports compliance with all the medications,  Discussed with patient supportive measures normal saline solution in each nostril, flonase  , humidifer , increase fluid intake , tylenol PRN , (if not Contraindicated), Red flags explained : cough > 4 weeks , fever > 48 hours with not response to tylenol , worsening of symptoms , bloody sputum., if any o this was advised to come back

## 2025-02-28 NOTE — PROGRESS NOTES
Charly Beasley, was evaluated through a synchronous (real-time) audio-video encounter. The patient (or guardian if applicable) is aware that this is a billable service, which includes applicable co-pays. This Virtual Visit was conducted with patient's (and/or legal guardian's) consent. Patient identification was verified, and a caregiver was present when appropriate.   The patient was located at Home: [address] [place] [address]  Provider was located at Facility (Appt Dept): [address]  [place] [address]  Confirm you are appropriately licensed, registered, or certified to deliver care in the state where the patient is located as indicated above. If you are not or unsure, please re-schedule the visit: Yes, I confirm.     Charly Beasley (:  1990) is a Established patient, presenting virtually for evaluation of the following:   Chief Complaint   Patient presents with    Follow-up    Nasal Congestion           Below is the assessment and plan developed based on review of pertinent history, physical exam, labs, studies, and medications.     Assessment & Plan  Uncontrolled type 2 diabetes mellitus with hyperglycemia (HCC)       Orders:    CBC with Auto Differential; Future    Lipid Panel; Future    Hemoglobin A1C; Future    Albumin/Creatinine Ratio, Urine; Future    Comprehensive Metabolic Panel; Future    TSH reflex to FT4; Future    Continuous Glucose  (DEXCOM G7 ) KASSANDRA; Use as directed    Continuous Glucose Sensor (DEXCOM G7 SENSOR) MISC; Use as directed    Morbid obesity            Mixed hyperlipidemia            Viral upper respiratory tract infection            Essential hypertension            Chronic congestive heart failure, unspecified heart failure type (HCC)            Subclinical hyperthyroidism          Patient was reminded to complete labs, previous orders from previous provider have , orders were

## 2025-02-28 NOTE — ASSESSMENT & PLAN NOTE
Orders:    CBC with Auto Differential; Future    Lipid Panel; Future    Hemoglobin A1C; Future    Albumin/Creatinine Ratio, Urine; Future    Comprehensive Metabolic Panel; Future    TSH reflex to FT4; Future    Continuous Glucose  (DEXCOM G7 ) KASSANDRA; Use as directed    Continuous Glucose Sensor (DEXCOM G7 SENSOR) MISC; Use as directed

## 2025-03-03 DIAGNOSIS — E11.65 UNCONTROLLED TYPE 2 DIABETES MELLITUS WITH HYPERGLYCEMIA (HCC): ICD-10-CM

## 2025-03-03 LAB
ALBUMIN SERPL-MCNC: 3.5 G/DL (ref 3.5–5)
ALBUMIN/GLOB SERPL: 0.9 (ref 1–1.9)
ALP SERPL-CCNC: 95 U/L (ref 40–129)
ALT SERPL-CCNC: 37 U/L (ref 8–55)
ANION GAP SERPL CALC-SCNC: 13 MMOL/L (ref 7–16)
AST SERPL-CCNC: 28 U/L (ref 15–37)
BASOPHILS # BLD: 0.07 K/UL (ref 0–0.2)
BASOPHILS NFR BLD: 1.1 % (ref 0–2)
BILIRUB SERPL-MCNC: 0.6 MG/DL (ref 0–1.2)
BUN SERPL-MCNC: 12 MG/DL (ref 6–23)
CALCIUM SERPL-MCNC: 8.9 MG/DL (ref 8.8–10.2)
CHLORIDE SERPL-SCNC: 100 MMOL/L (ref 98–107)
CHOLEST SERPL-MCNC: 266 MG/DL (ref 0–200)
CO2 SERPL-SCNC: 28 MMOL/L (ref 20–29)
CREAT SERPL-MCNC: 0.98 MG/DL (ref 0.8–1.3)
CREAT UR-MCNC: 57.7 MG/DL (ref 39–259)
DIFFERENTIAL METHOD BLD: ABNORMAL
EOSINOPHIL # BLD: 0.1 K/UL (ref 0–0.8)
EOSINOPHIL NFR BLD: 1.5 % (ref 0.5–7.8)
ERYTHROCYTE [DISTWIDTH] IN BLOOD BY AUTOMATED COUNT: 14.8 % (ref 11.9–14.6)
EST. AVERAGE GLUCOSE BLD GHB EST-MCNC: 318 MG/DL
GLOBULIN SER CALC-MCNC: 3.9 G/DL (ref 2.3–3.5)
GLUCOSE SERPL-MCNC: 217 MG/DL (ref 70–99)
HBA1C MFR BLD: 12.7 % (ref 0–5.6)
HCT VFR BLD AUTO: 48.8 % (ref 41.1–50.3)
HDLC SERPL-MCNC: 26 MG/DL (ref 40–60)
HDLC SERPL: 10.2 (ref 0–5)
HGB BLD-MCNC: 16.4 G/DL (ref 13.6–17.2)
IMM GRANULOCYTES # BLD AUTO: 0.1 K/UL (ref 0–0.5)
IMM GRANULOCYTES NFR BLD AUTO: 1.5 % (ref 0–5)
LDLC SERPL CALC-MCNC: ABNORMAL MG/DL (ref 0–100)
LDLC SERPL DIRECT ASSAY-MCNC: 74 MG/DL (ref 0–100)
LYMPHOCYTES # BLD: 2.66 K/UL (ref 0.5–4.6)
LYMPHOCYTES NFR BLD: 40.1 % (ref 13–44)
MCH RBC QN AUTO: 29 PG (ref 26.1–32.9)
MCHC RBC AUTO-ENTMCNC: 33.6 G/DL (ref 31.4–35)
MCV RBC AUTO: 86.2 FL (ref 82–102)
MICROALBUMIN UR-MCNC: 1.22 MG/DL (ref 0–20)
MICROALBUMIN/CREAT UR-RTO: 21 MG/G (ref 0–30)
MONOCYTES # BLD: 0.46 K/UL (ref 0.1–1.3)
MONOCYTES NFR BLD: 6.9 % (ref 4–12)
NEUTS SEG # BLD: 3.25 K/UL (ref 1.7–8.2)
NEUTS SEG NFR BLD: 48.9 % (ref 43–78)
NRBC # BLD: 0 K/UL (ref 0–0.2)
PLATELET # BLD AUTO: 297 K/UL (ref 150–450)
PMV BLD AUTO: 11 FL (ref 9.4–12.3)
POTASSIUM SERPL-SCNC: 3.8 MMOL/L (ref 3.5–5.1)
PROT SERPL-MCNC: 7.4 G/DL (ref 6.3–8.2)
RBC # BLD AUTO: 5.66 M/UL (ref 4.23–5.6)
SODIUM SERPL-SCNC: 140 MMOL/L (ref 136–145)
TRIGL SERPL-MCNC: 1011 MG/DL (ref 0–150)
TSH W FREE THYROID IF ABNORMAL: 0.35 UIU/ML (ref 0.27–4.2)
VLDLC SERPL CALC-MCNC: 202 MG/DL (ref 6–23)
WBC # BLD AUTO: 6.6 K/UL (ref 4.3–11.1)

## 2025-03-05 NOTE — RESULT ENCOUNTER NOTE
There has been multiple changes in his labs, his triglycerides are back to thousand, is he currently taking the medications prescribed by cardiology, is he taking the Crestor, if he is, I would suggest to discuss as soon as possible possible treatment changes in regards to his cholesterol with cardiology.  Also his diabetes is not currently well-controlled, reinforced importance of following with endocrine as planned, and compliance with his medications, A1c is higher than before at 12.7  His liver, kidney function test and thyroid function test are not showing significant abnormalities,  Please ask him if he has been skipping any of his medications from endocrine or cardiology,

## 2025-03-06 ENCOUNTER — PATIENT MESSAGE (OUTPATIENT)
Dept: INTERNAL MEDICINE CLINIC | Facility: CLINIC | Age: 35
End: 2025-03-06

## 2025-03-06 NOTE — TELEPHONE ENCOUNTER
Called patient left vm, will attempt to call back. [employer] message sent to patient with results.

## 2025-03-06 NOTE — TELEPHONE ENCOUNTER
Patient confirmed he has not been compliant with medications due to recent changes in personal life. Patient states she has been able to control blood sugars at home, and has started taking medications again.

## 2025-03-13 DIAGNOSIS — E11.65 UNCONTROLLED TYPE 2 DIABETES MELLITUS WITH HYPERGLYCEMIA (HCC): ICD-10-CM

## 2025-03-13 DIAGNOSIS — E05.90 SUBCLINICAL HYPERTHYROIDISM: ICD-10-CM

## 2025-03-13 DIAGNOSIS — I10 ESSENTIAL HYPERTENSION: ICD-10-CM

## 2025-03-13 RX ORDER — LISINOPRIL 40 MG/1
20 TABLET ORAL DAILY
Qty: 90 TABLET | Refills: 3 | Status: SHIPPED | OUTPATIENT
Start: 2025-03-13

## 2025-03-13 RX ORDER — METHIMAZOLE 5 MG/1
2.5 TABLET ORAL DAILY
Qty: 45 TABLET | Refills: 3 | Status: SHIPPED | OUTPATIENT
Start: 2025-03-13

## 2025-03-13 RX ORDER — ACYCLOVIR 400 MG/1
TABLET ORAL
Qty: 12 EACH | Refills: 1 | OUTPATIENT
Start: 2025-03-13

## 2025-03-13 NOTE — TELEPHONE ENCOUNTER
Requested Prescriptions     Refused Prescriptions Disp Refills    Continuous Glucose Sensor (DEXCOM G7 SENSOR) MISC 12 each 1     Sig: Use as directed     Refused By: YANE PEREZ     Reason for Refusal: Duplicate Request    Refills sent 02/28/2025.

## 2025-03-26 ENCOUNTER — OFFICE VISIT (OUTPATIENT)
Dept: ENDOCRINOLOGY | Age: 35
End: 2025-03-26
Payer: COMMERCIAL

## 2025-03-26 VITALS
BODY MASS INDEX: 50.62 KG/M2 | SYSTOLIC BLOOD PRESSURE: 124 MMHG | OXYGEN SATURATION: 97 % | HEART RATE: 83 BPM | DIASTOLIC BLOOD PRESSURE: 82 MMHG | HEIGHT: 66 IN | WEIGHT: 315 LBS

## 2025-03-26 DIAGNOSIS — E78.5 HYPERLIPIDEMIA ASSOCIATED WITH TYPE 2 DIABETES MELLITUS: ICD-10-CM

## 2025-03-26 DIAGNOSIS — I50.22 CHRONIC SYSTOLIC (CONGESTIVE) HEART FAILURE: ICD-10-CM

## 2025-03-26 DIAGNOSIS — E11.69 HYPERLIPIDEMIA ASSOCIATED WITH TYPE 2 DIABETES MELLITUS: ICD-10-CM

## 2025-03-26 DIAGNOSIS — E05.90 SUBCLINICAL HYPERTHYROIDISM: ICD-10-CM

## 2025-03-26 DIAGNOSIS — E11.65 UNCONTROLLED TYPE 2 DIABETES MELLITUS WITH HYPERGLYCEMIA (HCC): Primary | ICD-10-CM

## 2025-03-26 DIAGNOSIS — I10 ESSENTIAL HYPERTENSION: ICD-10-CM

## 2025-03-26 PROCEDURE — 3074F SYST BP LT 130 MM HG: CPT | Performed by: PHYSICIAN ASSISTANT

## 2025-03-26 PROCEDURE — 99214 OFFICE O/P EST MOD 30 MIN: CPT | Performed by: PHYSICIAN ASSISTANT

## 2025-03-26 PROCEDURE — 3046F HEMOGLOBIN A1C LEVEL >9.0%: CPT | Performed by: PHYSICIAN ASSISTANT

## 2025-03-26 PROCEDURE — 3079F DIAST BP 80-89 MM HG: CPT | Performed by: PHYSICIAN ASSISTANT

## 2025-03-26 RX ORDER — METHIMAZOLE 5 MG/1
2.5 TABLET ORAL DAILY
Qty: 45 TABLET | Refills: 3 | Status: SHIPPED | OUTPATIENT
Start: 2025-03-26

## 2025-03-26 RX ORDER — INSULIN LISPRO 100 U/ML
INJECTION, SOLUTION SUBCUTANEOUS
Qty: 15 ML | Refills: 11 | Status: SHIPPED | OUTPATIENT
Start: 2025-03-26

## 2025-03-26 RX ORDER — TIRZEPATIDE 10 MG/.5ML
10 INJECTION, SOLUTION SUBCUTANEOUS
Qty: 6 ML | Refills: 3 | Status: SHIPPED | OUTPATIENT
Start: 2025-03-26

## 2025-03-26 RX ORDER — ACYCLOVIR 400 MG/1
TABLET ORAL
Qty: 12 EACH | Refills: 1 | Status: SHIPPED | OUTPATIENT
Start: 2025-03-26

## 2025-03-26 NOTE — PROGRESS NOTES
[facility] ENDOCRINOLOGY   AND   THYROID NODULE CLINIC    Meron Chung PA-C  [facility] Endocrinology and Thyroid [facility]od[facility]  [address], [address]  [place] [address]  Phone [phone]  Facsimile [phone]          Charly Beasley is a 34 y.o. male seen 3/26/2025 for follow up evaluation of type 2 diabetes with CHF        Assessment and Plan:      Assessment & Plan          1. Uncontrolled type 2 diabetes mellitus with hyperglycemia (HCC)  - Elevated blood glucose levels: morning readings high 100s to low 200s, midday and evening readings 200s to 300s. A1c 12.7, high risk for stroke and heart attack. Elevated triglycerides, risk for pancreatitis on GLP-1 therapy. Average blood glucose 244, recent improvement. Urine microalbumin normal.  - Prescription for Mounjaro 10 mg AGAIN sent to replace Trulicity  - Dexcom G7  - Admelog 20 units before largest meal plus 5/50>150 correct TIDAC/HX  - Instructed to take Admelog 5-10 minutes before eating  - Refill for insulin needles provided  - Continue insulin before breakfast, lunch, dinner, bedtime  - Advised to inform if issues obtaining medications  - Counseled on regular exercise, healthier dietary choices, protein intake  - Advised to avoid walking barefoot, inspect and clean feet daily    At today's visit we discussed sequela associated with uncontrolled diabetes including increased risk of stroke, heart attack, kidney failure, amputation, retinopathy, neuropathy, and nephropathy.  Patient was strongly encouraged to comply with treatment regimen as well as dietary and exercise recommendations to aid in control of this chronic disease to help prevent complications associated with uncontrolled diabetes.      - Continuous Glucose Sensor (DEXCOM G7 SENSOR) MISC; Use as directed  Dispense: 12 each; Refill: 1  - ADMELOG SOLOSTAR 100 UNIT/ML SOPN; 20 units AC supper plus correction scale 5/50>150 AC/HS (up to 50 units daily)  Dispense: 15 mL;

## 2025-03-31 ENCOUNTER — TELEPHONE (OUTPATIENT)
Dept: ENDOCRINOLOGY | Age: 35
End: 2025-03-31

## 2025-04-08 NOTE — PROGRESS NOTES
(obstructive sleep apnea)  G47.33 -Now with moderate sleep apnea and responding well auto CPAP therapy.  - Compliance is off a little bit but working on it.  I did fix his auto stop which I think will probably help the machine not shut itself off.    - I also did adjust the ramp time from 40 minutes down to 30 minutes      2. Nocturnal hypoxemia  G47.34 -Resolved with treatment      3. Non-restorative sleep  G47.8 -Improving since he is using the machine more.      4. Morbid obesity with BMI of 60.0-69.9, adult  E66.01 - Overall weight is down from 700 pounds 340 range.  Hopefully can get Mounjaro and still lose weight.  - Still trying to exercise.    - I did show him how to do some exercises and is in the chair and hopefully that may help him strengthen his core and help with his pannus    Z68.44              SUMMARY:    -Continue auto CPAP at 7-15 with C-Flex 2  -I did showed off the auto off button today.  Hopefully that will prevent it from shutting itself off and on the night.  - He is benefiting, but again compliance needs to improve.  -Renew supplies    - Renew supplies and will follow-up in 4-5 months         - Continue to lose weight by diet and exercise  - Continue proper sleep hygiene.       Did review [place] score, and compliance data.      All questions are answered to the patient's satisfaction. The patient will call for further questions and concerns.    Follow up at the [facility] will be in 4-5 months to check compliance.   Follow up will be with the patient's referring physician as had been previously scheduled.    Bernard Bennett MD    Over 50% of today's office visit was spent in face to face time reviewing test results, prognosis, importance of compliance, education about disease process, benefits of medications, instructions for management of acute flare-ups, and follow up plans.       Electronically signed and dictated.  Please note if there are errors this is  likely

## 2025-04-09 ENCOUNTER — OFFICE VISIT (OUTPATIENT)
Age: 35
End: 2025-04-09
Payer: COMMERCIAL

## 2025-04-09 VITALS
TEMPERATURE: 97.2 F | HEART RATE: 91 BPM | SYSTOLIC BLOOD PRESSURE: 130 MMHG | BODY MASS INDEX: 50.62 KG/M2 | HEIGHT: 66 IN | DIASTOLIC BLOOD PRESSURE: 72 MMHG | OXYGEN SATURATION: 97 % | WEIGHT: 315 LBS | RESPIRATION RATE: 19 BRPM

## 2025-04-09 DIAGNOSIS — G47.34 NOCTURNAL HYPOXEMIA: ICD-10-CM

## 2025-04-09 DIAGNOSIS — G47.33 OSA (OBSTRUCTIVE SLEEP APNEA): Primary | ICD-10-CM

## 2025-04-09 DIAGNOSIS — G47.8 NON-RESTORATIVE SLEEP: ICD-10-CM

## 2025-04-09 DIAGNOSIS — E66.01 MORBID OBESITY WITH BMI OF 60.0-69.9, ADULT: ICD-10-CM

## 2025-04-09 PROCEDURE — G2211 COMPLEX E/M VISIT ADD ON: HCPCS | Performed by: INTERNAL MEDICINE

## 2025-04-09 PROCEDURE — 99214 OFFICE O/P EST MOD 30 MIN: CPT | Performed by: INTERNAL MEDICINE

## 2025-04-09 PROCEDURE — 3078F DIAST BP <80 MM HG: CPT | Performed by: INTERNAL MEDICINE

## 2025-04-09 PROCEDURE — 3075F SYST BP GE 130 - 139MM HG: CPT | Performed by: INTERNAL MEDICINE

## 2025-04-09 ASSESSMENT — SLEEP AND FATIGUE QUESTIONNAIRES
HOW LIKELY ARE YOU TO NOD OFF OR FALL ASLEEP WHILE SITTING AND TALKING TO SOMEONE: WOULD NEVER DOZE
HOW LIKELY ARE YOU TO NOD OFF OR FALL ASLEEP IN A CAR, WHILE STOPPED FOR A FEW MINUTES IN TRAFFIC: WOULD NEVER DOZE
HOW LIKELY ARE YOU TO NOD OFF OR FALL ASLEEP WHILE WATCHING TV: MODERATE CHANCE OF DOZING
HOW LIKELY ARE YOU TO NOD OFF OR FALL ASLEEP WHILE SITTING AND READING: SLIGHT CHANCE OF DOZING
HOW LIKELY ARE YOU TO NOD OFF OR FALL ASLEEP WHEN YOU ARE A PASSENGER IN A CAR FOR AN HOUR WITHOUT A BREAK: WOULD NEVER DOZE
HOW LIKELY ARE YOU TO NOD OFF OR FALL ASLEEP WHILE SITTING QUIETLY AFTER LUNCH WITHOUT ALCOHOL: WOULD NEVER DOZE
ESS TOTAL SCORE: 5
HOW LIKELY ARE YOU TO NOD OFF OR FALL ASLEEP WHILE LYING DOWN TO REST IN THE AFTERNOON WHEN CIRCUMSTANCES PERMIT: WOULD NEVER DOZE
HOW LIKELY ARE YOU TO NOD OFF OR FALL ASLEEP WHILE SITTING INACTIVE IN A PUBLIC PLACE: MODERATE CHANCE OF DOZING

## 2025-05-12 ENCOUNTER — OFFICE VISIT (OUTPATIENT)
Age: 35
End: 2025-05-12
Payer: COMMERCIAL

## 2025-05-12 VITALS
SYSTOLIC BLOOD PRESSURE: 122 MMHG | DIASTOLIC BLOOD PRESSURE: 80 MMHG | WEIGHT: 315 LBS | HEART RATE: 100 BPM | BODY MASS INDEX: 50.62 KG/M2 | HEIGHT: 66 IN

## 2025-05-12 DIAGNOSIS — E11.9 TYPE 2 DIABETES MELLITUS WITHOUT COMPLICATION, WITHOUT LONG-TERM CURRENT USE OF INSULIN (HCC): ICD-10-CM

## 2025-05-12 DIAGNOSIS — I50.22 CHRONIC SYSTOLIC (CONGESTIVE) HEART FAILURE (HCC): Primary | ICD-10-CM

## 2025-05-12 DIAGNOSIS — G47.33 OSA (OBSTRUCTIVE SLEEP APNEA): ICD-10-CM

## 2025-05-12 PROCEDURE — 3079F DIAST BP 80-89 MM HG: CPT | Performed by: INTERNAL MEDICINE

## 2025-05-12 PROCEDURE — 3046F HEMOGLOBIN A1C LEVEL >9.0%: CPT | Performed by: INTERNAL MEDICINE

## 2025-05-12 PROCEDURE — 93000 ELECTROCARDIOGRAM COMPLETE: CPT | Performed by: INTERNAL MEDICINE

## 2025-05-12 PROCEDURE — 99214 OFFICE O/P EST MOD 30 MIN: CPT | Performed by: INTERNAL MEDICINE

## 2025-05-12 PROCEDURE — 3074F SYST BP LT 130 MM HG: CPT | Performed by: INTERNAL MEDICINE

## 2025-05-12 RX ORDER — INSULIN GLARGINE 100 [IU]/ML
INJECTION, SOLUTION SUBCUTANEOUS NIGHTLY
COMMUNITY

## 2025-05-12 NOTE — PROGRESS NOTES
[facility] CARDIOLOGY, [address], [address] [place] [address]  PHONE: [phone]    SUBJECTIVE: /HPI  Charly Beasley (1990) is a 34 y.o. male seen for a follow up visit regarding the following:   Specialty Problems          Cardiology Problems    Essential hypertension        Congestive heart failure (HCC)        Mixed hyperlipidemia         Patient returns for normal follow-up.  He has a history of HFrEF he is on SGLT2 inhibitor Corlanor Aldactone ACE inhibitor and beta-blocker.  Most recent echo was done a year ago and showed EF 30 to 35%.  BMI is slowly over the years been reducing he is now at a BMI of 55.  Previously been greater than 60.  His peak weight when we started treating patient was somewhere around 7 or 800 pounds and he is currently at 346 lbs    Past Medical History, Past Surgical History, Family history, Social History, and Medications were all reviewed with the patient today and updated as necessary.    Allergies   Allergen Reactions    Bee Venom Anaphylaxis    Other Anaphylaxis    Barium Iodid Other (See Comments)     Mother's reaction    Bee Pollen Other (See Comments)     Past Medical History:   Diagnosis Date    Anasarca     Congestive heart failure (HCC)     sees Dr Parry    Hypertension     Lazy eye of right side     had since infancy    Mixed hyperlipidemia     Morbid obesity (HCC)     NAFLD (nonalcoholic fatty liver disease)     MARK (obstructive sleep apnea)     Type 2 diabetes mellitus (HCC)      Past Surgical History:   Procedure Laterality Date    GASTRIC BYPASS SURGERY  12/26/2018    gastric     TONSILLECTOMY       Family History   Problem Relation Age of Onset    Diabetes Paternal Grandfather     Heart Disease Paternal Grandfather     Hypertension Paternal Grandfather     Diabetes Father     Heart Disease Father     Hypertension Father     Hypertension Paternal Grandmother     Heart Disease Paternal Grandmother     Hypertension Maternal Grandfather

## 2025-05-21 ENCOUNTER — TELEPHONE (OUTPATIENT)
Dept: ENDOCRINOLOGY | Age: 35
End: 2025-05-21

## 2025-05-28 ENCOUNTER — TELEPHONE (OUTPATIENT)
Dept: ENDOCRINOLOGY | Age: 35
End: 2025-05-28

## 2025-05-28 DIAGNOSIS — E11.65 UNCONTROLLED TYPE 2 DIABETES MELLITUS WITH HYPERGLYCEMIA (HCC): Primary | ICD-10-CM

## 2025-05-28 NOTE — TELEPHONE ENCOUNTER
Changed to degludec. Can change to toujeo max if preferred. Trying to get a concentrated insulin to reduce from two shots a day down to 1

## 2025-07-02 ENCOUNTER — TELEPHONE (OUTPATIENT)
Dept: ENDOCRINOLOGY | Age: 35
End: 2025-07-02

## 2025-07-02 ENCOUNTER — OFFICE VISIT (OUTPATIENT)
Dept: ENDOCRINOLOGY | Age: 35
End: 2025-07-02
Payer: COMMERCIAL

## 2025-07-02 VITALS
HEART RATE: 93 BPM | OXYGEN SATURATION: 97 % | HEIGHT: 66 IN | RESPIRATION RATE: 18 BRPM | DIASTOLIC BLOOD PRESSURE: 72 MMHG | BODY MASS INDEX: 50.62 KG/M2 | WEIGHT: 315 LBS | SYSTOLIC BLOOD PRESSURE: 118 MMHG

## 2025-07-02 DIAGNOSIS — I50.22 CHRONIC SYSTOLIC (CONGESTIVE) HEART FAILURE (HCC): ICD-10-CM

## 2025-07-02 DIAGNOSIS — E78.5 HYPERLIPIDEMIA ASSOCIATED WITH TYPE 2 DIABETES MELLITUS (HCC): ICD-10-CM

## 2025-07-02 DIAGNOSIS — E11.65 UNCONTROLLED TYPE 2 DIABETES MELLITUS WITH HYPERGLYCEMIA (HCC): Primary | ICD-10-CM

## 2025-07-02 DIAGNOSIS — E11.69 HYPERLIPIDEMIA ASSOCIATED WITH TYPE 2 DIABETES MELLITUS (HCC): ICD-10-CM

## 2025-07-02 DIAGNOSIS — E05.90 SUBCLINICAL HYPERTHYROIDISM: ICD-10-CM

## 2025-07-02 DIAGNOSIS — I10 ESSENTIAL HYPERTENSION: ICD-10-CM

## 2025-07-02 LAB — HBA1C MFR BLD: 7.8 %

## 2025-07-02 PROCEDURE — 83036 HEMOGLOBIN GLYCOSYLATED A1C: CPT | Performed by: PHYSICIAN ASSISTANT

## 2025-07-02 PROCEDURE — 3051F HG A1C>EQUAL 7.0%<8.0%: CPT | Performed by: PHYSICIAN ASSISTANT

## 2025-07-02 PROCEDURE — 3078F DIAST BP <80 MM HG: CPT | Performed by: PHYSICIAN ASSISTANT

## 2025-07-02 PROCEDURE — 99214 OFFICE O/P EST MOD 30 MIN: CPT | Performed by: PHYSICIAN ASSISTANT

## 2025-07-02 PROCEDURE — 95251 CONT GLUC MNTR ANALYSIS I&R: CPT | Performed by: PHYSICIAN ASSISTANT

## 2025-07-02 PROCEDURE — 3074F SYST BP LT 130 MM HG: CPT | Performed by: PHYSICIAN ASSISTANT

## 2025-07-02 RX ORDER — METHIMAZOLE 5 MG/1
2.5 TABLET ORAL DAILY
Qty: 50 TABLET | Refills: 3 | Status: SHIPPED | OUTPATIENT
Start: 2025-07-02

## 2025-07-02 RX ORDER — SEMAGLUTIDE 2.68 MG/ML
2 INJECTION, SOLUTION SUBCUTANEOUS
Qty: 9 ML | Refills: 3 | Status: SHIPPED | OUTPATIENT
Start: 2025-07-02

## 2025-07-02 RX ORDER — BLOOD SUGAR DIAGNOSTIC
STRIP MISCELLANEOUS
Qty: 200 EACH | Refills: 3 | Status: SHIPPED | OUTPATIENT
Start: 2025-07-02 | End: 2025-07-03 | Stop reason: SDUPTHER

## 2025-07-02 NOTE — PROGRESS NOTES
[facility] ENDOCRINOLOGY   AND   THYROID NODULE CLINIC    Meron Chung PA-C  [facility] Endocrinology and Thyroid Nodule Clinic  [address], [address]  [place] [address]  Phone [phone]  Facsimile [phone]          Charly Beasley is a 34 y.o. male seen 7/2/2025 for follow up evaluation of type 2 diabetes with CHF        Assessment and Plan:  Interpretation of 72 hour glucose monitor:  At least 72 hours of data were reviewed.  The patient utilizes a dexcom G7 continuous glucose monitoring system.  The average glucose during the reviewed timeframe was 190 with a standard deviation of 47.  There is a pattern of constant postprandial hyperglycemia after meals and snacks.    Assessment & Plan        1. Uncontrolled type 2 diabetes mellitus with hyperglycemia (HCC)  A1c improved from 12.7 to 7.8. GMI indicates postprandial hyperglycemia.  Treatment plan: Current regimen: Tresiba 150 units most mornings, Ozempic 1 mg weekly, Farxiga, Admelog. Set reminder for consistent Tresiba administration. Administer Admelog 5 minutes before meals including correction. If taking late, do not use correction.     Continue walking regimen and consider diabetes education sessions. Prescribe OneTouch Verio Flex strips per pt request, broke accuchek meter .     Increase Ozempic from 1mg to 2 mg weekly.  Inform if issues obtaining medications.    - AMB POC HEMOGLOBIN A1C  - Insulin Pen Needle 32G X 6 MM MISC; Use with insulin up to 4 times daily, E11.65  Dispense: 400 each; Refill: 3  - HM DIABETES FOOT EXAM  - GLUCOSE MONITOR, 72 HOUR, PHYS INTERP        2. Essential hypertension  stable  BP Readings from Last 3 Encounters:   07/02/25 118/72   05/12/25 122/80   04/09/25 130/72       3. Body mass index (BMI) 50.0-59.9, adult (HCC)  Weight improving with lifestyle change.  Risk of disease states associated with obesity reviewed.   Significant weight loss from almost 700 pounds to 340 pounds.  Treatment plan:

## 2025-07-03 RX ORDER — BLOOD SUGAR DIAGNOSTIC
STRIP MISCELLANEOUS
Qty: 200 EACH | Refills: 3 | Status: SHIPPED | OUTPATIENT
Start: 2025-07-03